# Patient Record
Sex: MALE | Race: WHITE | NOT HISPANIC OR LATINO | Employment: FULL TIME | URBAN - METROPOLITAN AREA
[De-identification: names, ages, dates, MRNs, and addresses within clinical notes are randomized per-mention and may not be internally consistent; named-entity substitution may affect disease eponyms.]

---

## 2019-08-07 ENCOUNTER — HOSPITAL ENCOUNTER (INPATIENT)
Facility: MEDICAL CENTER | Age: 44
LOS: 3 days | DRG: 872 | End: 2019-08-11
Attending: EMERGENCY MEDICINE | Admitting: INTERNAL MEDICINE
Payer: COMMERCIAL

## 2019-08-07 DIAGNOSIS — Z94.0 RENAL TRANSPLANT, STATUS POST: ICD-10-CM

## 2019-08-07 DIAGNOSIS — A41.9 SEPSIS, DUE TO UNSPECIFIED ORGANISM: ICD-10-CM

## 2019-08-07 DIAGNOSIS — K83.09 ASCENDING CHOLANGITIS: ICD-10-CM

## 2019-08-07 PROCEDURE — 99285 EMERGENCY DEPT VISIT HI MDM: CPT

## 2019-08-08 ENCOUNTER — APPOINTMENT (OUTPATIENT)
Dept: RADIOLOGY | Facility: MEDICAL CENTER | Age: 44
DRG: 872 | End: 2019-08-08
Attending: EMERGENCY MEDICINE
Payer: COMMERCIAL

## 2019-08-08 ENCOUNTER — ANESTHESIA EVENT (OUTPATIENT)
Dept: SURGERY | Facility: MEDICAL CENTER | Age: 44
DRG: 872 | End: 2019-08-08
Payer: COMMERCIAL

## 2019-08-08 ENCOUNTER — HOSPITAL ENCOUNTER (OUTPATIENT)
Dept: RADIOLOGY | Facility: MEDICAL CENTER | Age: 44
End: 2019-08-08

## 2019-08-08 ENCOUNTER — ANESTHESIA (OUTPATIENT)
Dept: SURGERY | Facility: MEDICAL CENTER | Age: 44
DRG: 872 | End: 2019-08-08
Payer: COMMERCIAL

## 2019-08-08 ENCOUNTER — APPOINTMENT (OUTPATIENT)
Dept: RADIOLOGY | Facility: MEDICAL CENTER | Age: 44
DRG: 872 | End: 2019-08-08
Attending: INTERNAL MEDICINE
Payer: COMMERCIAL

## 2019-08-08 PROBLEM — A41.9 SEPSIS (HCC): Status: ACTIVE | Noted: 2019-08-08

## 2019-08-08 PROBLEM — K83.09 ASCENDING CHOLANGITIS: Status: ACTIVE | Noted: 2019-08-08

## 2019-08-08 PROBLEM — D84.9 IMMUNOSUPPRESSION (HCC): Status: ACTIVE | Noted: 2019-08-08

## 2019-08-08 PROBLEM — E66.9 OBESITY: Status: ACTIVE | Noted: 2019-08-08

## 2019-08-08 PROBLEM — Z94.0 KIDNEY TRANSPLANTED: Status: ACTIVE | Noted: 2019-08-08

## 2019-08-08 LAB
ALBUMIN SERPL BCP-MCNC: 3.5 G/DL (ref 3.2–4.9)
ALBUMIN/GLOB SERPL: 1.3 G/DL
ALP SERPL-CCNC: 78 U/L (ref 30–99)
ALT SERPL-CCNC: 379 U/L (ref 2–50)
ANION GAP SERPL CALC-SCNC: 12 MMOL/L (ref 0–11.9)
APPEARANCE UR: CLEAR
APTT PPP: 24 SEC (ref 24.7–36)
AST SERPL-CCNC: 282 U/L (ref 12–45)
BASOPHILS # BLD AUTO: 0 % (ref 0–1.8)
BASOPHILS # BLD: 0 K/UL (ref 0–0.12)
BILIRUB SERPL-MCNC: 3.7 MG/DL (ref 0.1–1.5)
BILIRUB UR QL STRIP.AUTO: ABNORMAL
BUN SERPL-MCNC: 17 MG/DL (ref 8–22)
CALCIUM SERPL-MCNC: 8.7 MG/DL (ref 8.5–10.5)
CHLORIDE SERPL-SCNC: 109 MMOL/L (ref 96–112)
CO2 SERPL-SCNC: 19 MMOL/L (ref 20–33)
COLOR UR: ABNORMAL
CREAT SERPL-MCNC: 1.03 MG/DL (ref 0.5–1.4)
EOSINOPHIL # BLD AUTO: 0 K/UL (ref 0–0.51)
EOSINOPHIL NFR BLD: 0 % (ref 0–6.9)
ERYTHROCYTE [DISTWIDTH] IN BLOOD BY AUTOMATED COUNT: 43.9 FL (ref 35.9–50)
GLOBULIN SER CALC-MCNC: 2.8 G/DL (ref 1.9–3.5)
GLUCOSE SERPL-MCNC: 125 MG/DL (ref 65–99)
GLUCOSE UR STRIP.AUTO-MCNC: NEGATIVE MG/DL
HCT VFR BLD AUTO: 40.8 % (ref 42–52)
HGB BLD-MCNC: 13.6 G/DL (ref 14–18)
INR PPP: 1.16 (ref 0.87–1.13)
KETONES UR STRIP.AUTO-MCNC: NEGATIVE MG/DL
LACTATE BLD-SCNC: 1.8 MMOL/L (ref 0.5–2)
LEUKOCYTE ESTERASE UR QL STRIP.AUTO: NEGATIVE
LYMPHOCYTES # BLD AUTO: 0 K/UL (ref 1–4.8)
LYMPHOCYTES NFR BLD: 0 % (ref 22–41)
MANUAL DIFF BLD: ABNORMAL
MCH RBC QN AUTO: 29.3 PG (ref 27–33)
MCHC RBC AUTO-ENTMCNC: 33.3 G/DL (ref 33.7–35.3)
MCV RBC AUTO: 87.9 FL (ref 81.4–97.8)
MICRO URNS: ABNORMAL
MONOCYTES # BLD AUTO: 1.53 K/UL (ref 0–0.85)
MONOCYTES NFR BLD AUTO: 4.3 % (ref 0–13.4)
MORPHOLOGY BLD-IMP: NORMAL
NEUTROPHILS # BLD AUTO: 33.97 K/UL (ref 1.82–7.42)
NEUTROPHILS NFR BLD: 84.6 % (ref 44–72)
NEUTS BAND NFR BLD MANUAL: 11.1 % (ref 0–10)
NITRITE UR QL STRIP.AUTO: NEGATIVE
NRBC # BLD AUTO: 0 K/UL
NRBC BLD-RTO: 0 /100 WBC
PH UR STRIP.AUTO: 6.5 [PH] (ref 5–8)
PLATELET # BLD AUTO: 256 K/UL (ref 164–446)
PLATELET BLD QL SMEAR: NORMAL
PMV BLD AUTO: 9.5 FL (ref 9–12.9)
POTASSIUM SERPL-SCNC: 4 MMOL/L (ref 3.6–5.5)
PROT SERPL-MCNC: 6.3 G/DL (ref 6–8.2)
PROT UR QL STRIP: NEGATIVE MG/DL
PROTHROMBIN TIME: 15.1 SEC (ref 12–14.6)
RBC # BLD AUTO: 4.64 M/UL (ref 4.7–6.1)
RBC BLD AUTO: NORMAL
RBC UR QL AUTO: NEGATIVE
SODIUM SERPL-SCNC: 140 MMOL/L (ref 135–145)
SP GR UR STRIP.AUTO: 1.02
UROBILINOGEN UR STRIP.AUTO-MCNC: 1 MG/DL
WBC # BLD AUTO: 35.5 K/UL (ref 4.8–10.8)

## 2019-08-08 PROCEDURE — 71045 X-RAY EXAM CHEST 1 VIEW: CPT

## 2019-08-08 PROCEDURE — 700105 HCHG RX REV CODE 258: Performed by: EMERGENCY MEDICINE

## 2019-08-08 PROCEDURE — 700101 HCHG RX REV CODE 250: Performed by: ANESTHESIOLOGY

## 2019-08-08 PROCEDURE — 0F798DZ DILATION OF COMMON BILE DUCT WITH INTRALUMINAL DEVICE, VIA NATURAL OR ARTIFICIAL OPENING ENDOSCOPIC: ICD-10-PCS | Performed by: INTERNAL MEDICINE

## 2019-08-08 PROCEDURE — 700111 HCHG RX REV CODE 636 W/ 250 OVERRIDE (IP): Performed by: ANESTHESIOLOGY

## 2019-08-08 PROCEDURE — 770020 HCHG ROOM/CARE - TELE (206)

## 2019-08-08 PROCEDURE — 160203 HCHG ENDO MINUTES - 1ST 30 MINS LEVEL 4: Performed by: INTERNAL MEDICINE

## 2019-08-08 PROCEDURE — 502240 HCHG MISC OR SUPPLY RC 0272: Performed by: INTERNAL MEDICINE

## 2019-08-08 PROCEDURE — 700102 HCHG RX REV CODE 250 W/ 637 OVERRIDE(OP): Performed by: INTERNAL MEDICINE

## 2019-08-08 PROCEDURE — 87040 BLOOD CULTURE FOR BACTERIA: CPT

## 2019-08-08 PROCEDURE — 85730 THROMBOPLASTIN TIME PARTIAL: CPT

## 2019-08-08 PROCEDURE — 700111 HCHG RX REV CODE 636 W/ 250 OVERRIDE (IP): Performed by: INTERNAL MEDICINE

## 2019-08-08 PROCEDURE — 94760 N-INVAS EAR/PLS OXIMETRY 1: CPT

## 2019-08-08 PROCEDURE — 87086 URINE CULTURE/COLONY COUNT: CPT

## 2019-08-08 PROCEDURE — 700101 HCHG RX REV CODE 250: Performed by: INTERNAL MEDICINE

## 2019-08-08 PROCEDURE — A9270 NON-COVERED ITEM OR SERVICE: HCPCS | Performed by: INTERNAL MEDICINE

## 2019-08-08 PROCEDURE — 36415 COLL VENOUS BLD VENIPUNCTURE: CPT

## 2019-08-08 PROCEDURE — 700111 HCHG RX REV CODE 636 W/ 250 OVERRIDE (IP): Performed by: EMERGENCY MEDICINE

## 2019-08-08 PROCEDURE — 500066 HCHG BITE BLOCK, ECT: Performed by: INTERNAL MEDICINE

## 2019-08-08 PROCEDURE — 160048 HCHG OR STATISTICAL LEVEL 1-5: Performed by: INTERNAL MEDICINE

## 2019-08-08 PROCEDURE — 80053 COMPREHEN METABOLIC PANEL: CPT

## 2019-08-08 PROCEDURE — 160208 HCHG ENDO MINUTES - EA ADDL 1 MIN LEVEL 4: Performed by: INTERNAL MEDICINE

## 2019-08-08 PROCEDURE — 110371 HCHG SHELL REV 272: Performed by: INTERNAL MEDICINE

## 2019-08-08 PROCEDURE — 160002 HCHG RECOVERY MINUTES (STAT): Performed by: INTERNAL MEDICINE

## 2019-08-08 PROCEDURE — 81003 URINALYSIS AUTO W/O SCOPE: CPT

## 2019-08-08 PROCEDURE — 85610 PROTHROMBIN TIME: CPT

## 2019-08-08 PROCEDURE — 99223 1ST HOSP IP/OBS HIGH 75: CPT | Performed by: INTERNAL MEDICINE

## 2019-08-08 PROCEDURE — 85027 COMPLETE CBC AUTOMATED: CPT

## 2019-08-08 PROCEDURE — 85007 BL SMEAR W/DIFF WBC COUNT: CPT

## 2019-08-08 PROCEDURE — C2617 STENT, NON-COR, TEM W/O DEL: HCPCS | Performed by: INTERNAL MEDICINE

## 2019-08-08 PROCEDURE — 99358 PROLONG SERVICE W/O CONTACT: CPT | Performed by: INTERNAL MEDICINE

## 2019-08-08 PROCEDURE — 83605 ASSAY OF LACTIC ACID: CPT

## 2019-08-08 PROCEDURE — 700105 HCHG RX REV CODE 258: Performed by: INTERNAL MEDICINE

## 2019-08-08 PROCEDURE — 160009 HCHG ANES TIME/MIN: Performed by: INTERNAL MEDICINE

## 2019-08-08 PROCEDURE — 160035 HCHG PACU - 1ST 60 MINS PHASE I: Performed by: INTERNAL MEDICINE

## 2019-08-08 PROCEDURE — 96365 THER/PROPH/DIAG IV INF INIT: CPT

## 2019-08-08 PROCEDURE — 304561 HCHG STAT O2

## 2019-08-08 DEVICE — STENT BILIARY ADVANTIX 10FR X 7CM: Type: IMPLANTABLE DEVICE | Status: FUNCTIONAL

## 2019-08-08 RX ORDER — MIDAZOLAM HYDROCHLORIDE 1 MG/ML
INJECTION INTRAMUSCULAR; INTRAVENOUS PRN
Status: DISCONTINUED | OUTPATIENT
Start: 2019-08-08 | End: 2019-08-08 | Stop reason: SURG

## 2019-08-08 RX ORDER — PROMETHAZINE HYDROCHLORIDE 25 MG/1
12.5-25 SUPPOSITORY RECTAL EVERY 4 HOURS PRN
Status: DISCONTINUED | OUTPATIENT
Start: 2019-08-08 | End: 2019-08-11 | Stop reason: HOSPADM

## 2019-08-08 RX ORDER — AMOXICILLIN 250 MG
2 CAPSULE ORAL 2 TIMES DAILY
Status: DISCONTINUED | OUTPATIENT
Start: 2019-08-08 | End: 2019-08-11 | Stop reason: HOSPADM

## 2019-08-08 RX ORDER — TACROLIMUS 1 MG/1
1 CAPSULE ORAL 2 TIMES DAILY
Status: DISCONTINUED | OUTPATIENT
Start: 2019-08-08 | End: 2019-08-11 | Stop reason: HOSPADM

## 2019-08-08 RX ORDER — MEPERIDINE HYDROCHLORIDE 25 MG/ML
12.5 INJECTION INTRAMUSCULAR; INTRAVENOUS; SUBCUTANEOUS
Status: DISCONTINUED | OUTPATIENT
Start: 2019-08-08 | End: 2019-08-08 | Stop reason: HOSPADM

## 2019-08-08 RX ORDER — POLYETHYLENE GLYCOL 3350 17 G/17G
1 POWDER, FOR SOLUTION ORAL
Status: DISCONTINUED | OUTPATIENT
Start: 2019-08-08 | End: 2019-08-11 | Stop reason: HOSPADM

## 2019-08-08 RX ORDER — ONDANSETRON 2 MG/ML
INJECTION INTRAMUSCULAR; INTRAVENOUS PRN
Status: DISCONTINUED | OUTPATIENT
Start: 2019-08-08 | End: 2019-08-08 | Stop reason: SURG

## 2019-08-08 RX ORDER — ACETAMINOPHEN 325 MG/1
650 TABLET ORAL EVERY 6 HOURS PRN
Status: DISCONTINUED | OUTPATIENT
Start: 2019-08-08 | End: 2019-08-11 | Stop reason: HOSPADM

## 2019-08-08 RX ORDER — OXYCODONE HYDROCHLORIDE 5 MG/1
5 TABLET ORAL
Status: DISCONTINUED | OUTPATIENT
Start: 2019-08-08 | End: 2019-08-11 | Stop reason: HOSPADM

## 2019-08-08 RX ORDER — ROCURONIUM BROMIDE 10 MG/ML
INJECTION, SOLUTION INTRAVENOUS PRN
Status: DISCONTINUED | OUTPATIENT
Start: 2019-08-08 | End: 2019-08-08 | Stop reason: SURG

## 2019-08-08 RX ORDER — SODIUM CHLORIDE, SODIUM LACTATE, POTASSIUM CHLORIDE, CALCIUM CHLORIDE 600; 310; 30; 20 MG/100ML; MG/100ML; MG/100ML; MG/100ML
INJECTION, SOLUTION INTRAVENOUS CONTINUOUS
Status: DISCONTINUED | OUTPATIENT
Start: 2019-08-08 | End: 2019-08-08 | Stop reason: HOSPADM

## 2019-08-08 RX ORDER — SODIUM CHLORIDE, SODIUM LACTATE, POTASSIUM CHLORIDE, CALCIUM CHLORIDE 600; 310; 30; 20 MG/100ML; MG/100ML; MG/100ML; MG/100ML
INJECTION, SOLUTION INTRAVENOUS CONTINUOUS
Status: DISCONTINUED | OUTPATIENT
Start: 2019-08-08 | End: 2019-08-09

## 2019-08-08 RX ORDER — LIDOCAINE HYDROCHLORIDE 20 MG/ML
INJECTION, SOLUTION EPIDURAL; INFILTRATION; INTRACAUDAL; PERINEURAL PRN
Status: DISCONTINUED | OUTPATIENT
Start: 2019-08-08 | End: 2019-08-08 | Stop reason: SURG

## 2019-08-08 RX ORDER — DIPHENHYDRAMINE HYDROCHLORIDE 50 MG/ML
12.5 INJECTION INTRAMUSCULAR; INTRAVENOUS
Status: DISCONTINUED | OUTPATIENT
Start: 2019-08-08 | End: 2019-08-08 | Stop reason: HOSPADM

## 2019-08-08 RX ORDER — ONDANSETRON 2 MG/ML
4 INJECTION INTRAMUSCULAR; INTRAVENOUS
Status: DISCONTINUED | OUTPATIENT
Start: 2019-08-08 | End: 2019-08-11 | Stop reason: HOSPADM

## 2019-08-08 RX ORDER — OXYCODONE HCL 5 MG/5 ML
10 SOLUTION, ORAL ORAL
Status: DISCONTINUED | OUTPATIENT
Start: 2019-08-08 | End: 2019-08-08 | Stop reason: HOSPADM

## 2019-08-08 RX ORDER — BISACODYL 10 MG
10 SUPPOSITORY, RECTAL RECTAL
Status: DISCONTINUED | OUTPATIENT
Start: 2019-08-08 | End: 2019-08-11 | Stop reason: HOSPADM

## 2019-08-08 RX ORDER — PREDNISONE 5 MG/1
7.5 TABLET ORAL DAILY
COMMUNITY

## 2019-08-08 RX ORDER — OXYCODONE HCL 5 MG/5 ML
5 SOLUTION, ORAL ORAL
Status: DISCONTINUED | OUTPATIENT
Start: 2019-08-08 | End: 2019-08-08 | Stop reason: HOSPADM

## 2019-08-08 RX ORDER — ONDANSETRON 2 MG/ML
4 INJECTION INTRAMUSCULAR; INTRAVENOUS EVERY 4 HOURS PRN
Status: DISCONTINUED | OUTPATIENT
Start: 2019-08-08 | End: 2019-08-11 | Stop reason: HOSPADM

## 2019-08-08 RX ORDER — PREDNISONE 5 MG/1
7.5 TABLET ORAL DAILY
Status: DISCONTINUED | OUTPATIENT
Start: 2019-08-08 | End: 2019-08-11 | Stop reason: HOSPADM

## 2019-08-08 RX ORDER — HYDROMORPHONE HYDROCHLORIDE 1 MG/ML
0.4 INJECTION, SOLUTION INTRAMUSCULAR; INTRAVENOUS; SUBCUTANEOUS
Status: DISCONTINUED | OUTPATIENT
Start: 2019-08-08 | End: 2019-08-08 | Stop reason: HOSPADM

## 2019-08-08 RX ORDER — HYDROMORPHONE HYDROCHLORIDE 1 MG/ML
0.2 INJECTION, SOLUTION INTRAMUSCULAR; INTRAVENOUS; SUBCUTANEOUS
Status: DISCONTINUED | OUTPATIENT
Start: 2019-08-08 | End: 2019-08-08 | Stop reason: HOSPADM

## 2019-08-08 RX ORDER — PROMETHAZINE HYDROCHLORIDE 25 MG/1
12.5-25 TABLET ORAL EVERY 4 HOURS PRN
Status: DISCONTINUED | OUTPATIENT
Start: 2019-08-08 | End: 2019-08-11 | Stop reason: HOSPADM

## 2019-08-08 RX ORDER — ONDANSETRON 4 MG/1
4 TABLET, ORALLY DISINTEGRATING ORAL EVERY 4 HOURS PRN
Status: DISCONTINUED | OUTPATIENT
Start: 2019-08-08 | End: 2019-08-11 | Stop reason: HOSPADM

## 2019-08-08 RX ORDER — TACROLIMUS 1 MG/1
1 CAPSULE ORAL 2 TIMES DAILY
COMMUNITY

## 2019-08-08 RX ORDER — OXYCODONE HYDROCHLORIDE 5 MG/1
2.5 TABLET ORAL
Status: DISCONTINUED | OUTPATIENT
Start: 2019-08-08 | End: 2019-08-11 | Stop reason: HOSPADM

## 2019-08-08 RX ORDER — MORPHINE SULFATE 4 MG/ML
2 INJECTION, SOLUTION INTRAMUSCULAR; INTRAVENOUS
Status: DISCONTINUED | OUTPATIENT
Start: 2019-08-08 | End: 2019-08-11 | Stop reason: HOSPADM

## 2019-08-08 RX ORDER — LABETALOL HYDROCHLORIDE 5 MG/ML
5 INJECTION, SOLUTION INTRAVENOUS
Status: DISCONTINUED | OUTPATIENT
Start: 2019-08-08 | End: 2019-08-08 | Stop reason: HOSPADM

## 2019-08-08 RX ORDER — HYDROMORPHONE HYDROCHLORIDE 1 MG/ML
0.1 INJECTION, SOLUTION INTRAMUSCULAR; INTRAVENOUS; SUBCUTANEOUS
Status: DISCONTINUED | OUTPATIENT
Start: 2019-08-08 | End: 2019-08-08 | Stop reason: HOSPADM

## 2019-08-08 RX ORDER — SODIUM CHLORIDE 9 MG/ML
1000 INJECTION, SOLUTION INTRAVENOUS
Status: DISCONTINUED | OUTPATIENT
Start: 2019-08-08 | End: 2019-08-11 | Stop reason: HOSPADM

## 2019-08-08 RX ORDER — ONDANSETRON 2 MG/ML
4 INJECTION INTRAMUSCULAR; INTRAVENOUS
Status: DISCONTINUED | OUTPATIENT
Start: 2019-08-08 | End: 2019-08-08 | Stop reason: HOSPADM

## 2019-08-08 RX ADMIN — CEFEPIME 2 G: 2 INJECTION, POWDER, FOR SOLUTION INTRAVENOUS at 01:52

## 2019-08-08 RX ADMIN — SUGAMMADEX 200 MG: 100 INJECTION, SOLUTION INTRAVENOUS at 14:57

## 2019-08-08 RX ADMIN — SODIUM CHLORIDE, POTASSIUM CHLORIDE, SODIUM LACTATE AND CALCIUM CHLORIDE: 600; 310; 30; 20 INJECTION, SOLUTION INTRAVENOUS at 08:12

## 2019-08-08 RX ADMIN — MIDAZOLAM 2 MG: 1 INJECTION INTRAMUSCULAR; INTRAVENOUS at 14:09

## 2019-08-08 RX ADMIN — ROCURONIUM BROMIDE 30 MG: 10 INJECTION, SOLUTION INTRAVENOUS at 14:13

## 2019-08-08 RX ADMIN — ONDANSETRON 4 MG: 2 INJECTION INTRAMUSCULAR; INTRAVENOUS at 14:50

## 2019-08-08 RX ADMIN — ONDANSETRON 4 MG: 2 INJECTION INTRAMUSCULAR; INTRAVENOUS at 08:12

## 2019-08-08 RX ADMIN — FENTANYL CITRATE 50 MCG: 50 INJECTION, SOLUTION INTRAMUSCULAR; INTRAVENOUS at 14:52

## 2019-08-08 RX ADMIN — FENTANYL CITRATE 50 MCG: 50 INJECTION, SOLUTION INTRAMUSCULAR; INTRAVENOUS at 14:35

## 2019-08-08 RX ADMIN — PREDNISONE 7.5 MG: 5 TABLET ORAL at 06:43

## 2019-08-08 RX ADMIN — METRONIDAZOLE 500 MG: 500 INJECTION, SOLUTION INTRAVENOUS at 21:56

## 2019-08-08 RX ADMIN — PROPOFOL 130 MG: 10 INJECTION, EMULSION INTRAVENOUS at 14:13

## 2019-08-08 RX ADMIN — TACROLIMUS 1 MG: 1 CAPSULE ORAL at 18:17

## 2019-08-08 RX ADMIN — METRONIDAZOLE 500 MG: 500 INJECTION, SOLUTION INTRAVENOUS at 15:22

## 2019-08-08 RX ADMIN — ACETAMINOPHEN 650 MG: 325 TABLET, FILM COATED ORAL at 23:58

## 2019-08-08 RX ADMIN — CEFEPIME 2 G: 2 INJECTION, POWDER, FOR SOLUTION INTRAVENOUS at 13:12

## 2019-08-08 RX ADMIN — MORPHINE SULFATE 2 MG: 4 INJECTION INTRAVENOUS at 08:22

## 2019-08-08 RX ADMIN — METRONIDAZOLE 500 MG: 500 INJECTION, SOLUTION INTRAVENOUS at 08:10

## 2019-08-08 RX ADMIN — SODIUM CHLORIDE, POTASSIUM CHLORIDE, SODIUM LACTATE AND CALCIUM CHLORIDE: 600; 310; 30; 20 INJECTION, SOLUTION INTRAVENOUS at 04:07

## 2019-08-08 RX ADMIN — SODIUM CHLORIDE, POTASSIUM CHLORIDE, SODIUM LACTATE AND CALCIUM CHLORIDE: 600; 310; 30; 20 INJECTION, SOLUTION INTRAVENOUS at 18:17

## 2019-08-08 RX ADMIN — FENTANYL CITRATE 50 MCG: 50 INJECTION, SOLUTION INTRAMUSCULAR; INTRAVENOUS at 14:53

## 2019-08-08 RX ADMIN — LIDOCAINE HYDROCHLORIDE 30 ML: 20 INJECTION, SOLUTION EPIDURAL; INFILTRATION; INTRACAUDAL; PERINEURAL at 14:13

## 2019-08-08 RX ADMIN — FENTANYL CITRATE 100 MCG: 50 INJECTION, SOLUTION INTRAMUSCULAR; INTRAVENOUS at 14:13

## 2019-08-08 RX ADMIN — TACROLIMUS 1 MG: 1 CAPSULE ORAL at 06:43

## 2019-08-08 SDOH — HEALTH STABILITY: MENTAL HEALTH: HOW OFTEN DO YOU HAVE A DRINK CONTAINING ALCOHOL?: 2-3 TIMES A WEEK

## 2019-08-08 SDOH — HEALTH STABILITY: MENTAL HEALTH: HOW MANY STANDARD DRINKS CONTAINING ALCOHOL DO YOU HAVE ON A TYPICAL DAY?: 1 OR 2

## 2019-08-08 ASSESSMENT — LIFESTYLE VARIABLES
DOES PATIENT WANT TO STOP DRINKING: YES
ALCOHOL_USE: YES
DOES PATIENT WANT TO TALK TO SOMEONE ABOUT QUITTING: NO
HAVE PEOPLE ANNOYED YOU BY CRITICIZING YOUR DRINKING: NO
EVER_SMOKED: NEVER
CONSUMPTION TOTAL: NEGATIVE
TOTAL SCORE: 0
EVER HAD A DRINK FIRST THING IN THE MORNING TO STEADY YOUR NERVES TO GET RID OF A HANGOVER: NO
EVER FELT BAD OR GUILTY ABOUT YOUR DRINKING: NO
HOW MANY TIMES IN THE PAST YEAR HAVE YOU HAD 5 OR MORE DRINKS IN A DAY: 0
ON A TYPICAL DAY WHEN YOU DRINK ALCOHOL HOW MANY DRINKS DO YOU HAVE: 2
EVER_SMOKED: NEVER
TOTAL SCORE: 0
TOTAL SCORE: 0
AVERAGE NUMBER OF DAYS PER WEEK YOU HAVE A DRINK CONTAINING ALCOHOL: .5
HAVE YOU EVER FELT YOU SHOULD CUT DOWN ON YOUR DRINKING: NO

## 2019-08-08 ASSESSMENT — COPD QUESTIONNAIRES
HAVE YOU SMOKED AT LEAST 100 CIGARETTES IN YOUR ENTIRE LIFE: NO/DON'T KNOW
COPD SCREENING SCORE: 0
DO YOU EVER COUGH UP ANY MUCUS OR PHLEGM?: NO/ONLY WITH OCCASIONAL COLDS OR INFECTIONS
DURING THE PAST 4 WEEKS HOW MUCH DID YOU FEEL SHORT OF BREATH: NONE/LITTLE OF THE TIME

## 2019-08-08 ASSESSMENT — ENCOUNTER SYMPTOMS
SORE THROAT: 0
HEADACHES: 0
SPUTUM PRODUCTION: 0
MYALGIAS: 0
SHORTNESS OF BREATH: 0
PALPITATIONS: 0
FEVER: 1
BLURRED VISION: 0
NECK PAIN: 0
COUGH: 0
VOMITING: 1
ABDOMINAL PAIN: 1
NAUSEA: 1
SEIZURES: 0
DIAPHORESIS: 0
CHILLS: 1
BACK PAIN: 0
DIARRHEA: 0
FLANK PAIN: 0
BLOOD IN STOOL: 0
DIZZINESS: 0
BRUISES/BLEEDS EASILY: 0
WHEEZING: 0
FOCAL WEAKNESS: 0

## 2019-08-08 ASSESSMENT — COGNITIVE AND FUNCTIONAL STATUS - GENERAL
SUGGESTED CMS G CODE MODIFIER DAILY ACTIVITY: CH
MOBILITY SCORE: 24
DAILY ACTIVITIY SCORE: 24
SUGGESTED CMS G CODE MODIFIER MOBILITY: CH

## 2019-08-08 ASSESSMENT — PATIENT HEALTH QUESTIONNAIRE - PHQ9
1. LITTLE INTEREST OR PLEASURE IN DOING THINGS: NOT AT ALL
SUM OF ALL RESPONSES TO PHQ9 QUESTIONS 1 AND 2: 0
2. FEELING DOWN, DEPRESSED, IRRITABLE, OR HOPELESS: NOT AT ALL

## 2019-08-08 ASSESSMENT — PAIN SCALES - GENERAL: PAIN_LEVEL: 0

## 2019-08-08 NOTE — ANESTHESIA POSTPROCEDURE EVALUATION
Patient: Gerardo Treviño    Procedure Summary     Date:  08/08/19 Room / Location:  [facility] 06 / SURGERY [facility]    Anesthesia Start:  1409 Anesthesia Stop:  1508    Procedure:  ERCP (ENDOSCOPIC RETROGRADE CHOLANGIOPANCREATOGRAPHY) (N/A Mouth) Diagnosis:  (stent placement)    Surgeon:  Govind Paula M.D. Responsible Provider:  Gaurav Dye M.D.    Anesthesia Type:  general ASA Status:  3          Final Anesthesia Type: general  Last vitals  BP   Blood Pressure: (!) 95/50    Temp   37.3 °C (99.1 °F)    Pulse   Pulse: 82   Resp   18    SpO2   95 %      Anesthesia Post Evaluation    Patient location during evaluation: PACU  Patient participation: complete - patient participated  Level of consciousness: awake and alert  Pain score: 0    Airway patency: patent  Anesthetic complications: no  Cardiovascular status: hemodynamically stable  Respiratory status: acceptable  Hydration status: euvolemic    PONV: none           Nurse Pain Score: 0 (NPRS)

## 2019-08-08 NOTE — ASSESSMENT & PLAN NOTE
ERCP 8/8 with stone removal, pus drainage and stent placement  On IV abx with ceftriaxone and metronidazole  Pain control  Follow culture: NTD  GI following  Follow cmp in am

## 2019-08-08 NOTE — ANESTHESIA QCDR
2019 [facility] Clinical Data Registry (for Quality Improvement)     Postoperative nausea/vomiting risk protocol (Adult = 18 yrs and Pediatric 3-17 yrs)- (430 and 463)  General inhalation anesthetic (NOT TIVA) with PONV risk factors: Yes  Provision of anti-emetic therapy with at least 2 different classes of agents: No   Patient DID NOT receive anti-emetic therapy and reason is documented in Medical Record: Yes       Multimodal Pain Management- (AQI59)  Patient undergoing Elective Surgery (i.e. Outpatient, or ASC, or Prescheduled Surgery prior to Hospital Admission): No  Use of Multimodal Pain Management, two or more drugs and/or interventions, NOT including systemic opioids: N/A  Exception: Documented allergy to multiple classes of analgesics: N/A    PACU assessment of acute postoperative pain prior to Anesthesia Care End- Applies to Patients Age = 18- (ABG7)  Initial PACU pain score is which of the following: < 7/10  Patient unable to report pain score: N/A    Post-anesthetic transfer of care checklist/protocol to PACU/ICU- (426 and 427)  Upon conclusion of case, patient transferred to which of the following locations: PACU/Non-ICU  Use of transfer checklist/protocol: Yes  Exclusion: Service Performed in Patient Hospital Room (and thus did not require transfer): N/A    PACU Reintubation- (AQI31)  General anesthesia requiring endotracheal intubation (ETT) along with subsequent extubation in OR or PACU: Yes  Required reintubation in the PACU: No   Extubation was a planned trial documented in the medical record prior to removal of the original airway device:  N/A    Unplanned admission to ICU related to anesthesia service up through end of PACU care- (MD51)  Unplanned admission to ICU (not initially anticipated at anesthesia start time): No          flank pain

## 2019-08-08 NOTE — PROCEDURES
Endoscopic Retrograde Cholangiopancreatography    Date of Procedure:  8/8/2019  Primary Care Provider:    Attending Physician:  Govind Paula MD  Indications: Ascending cholangitis        Instrument: Olympus Flexible Sideviewing Endoscope  Sedation:  Anesthesiologist/Type of Anesthesia:  Anesthesiologist: Gaurav Dye M.D./General    Surgical Staff:  Circulator: Brett Hinton R.N.  Endoscopy Technician: Stella Teran    Pre-Anesthesia Assessment:  Prior to the procedure, a History and Physical was performed, and patient medications and allergies were reviewed. The patient’s tolerance of previous anesthesia was also reviewed. The risks and benefits of the procedure and the sedation options and risks were discussed with the patient including but not limited to infection, bleeding, aspiration, perforation, adverse medication reaction, missed diagnosis, missed lesions, and pancreatitis. The patient verbalized understanding. All questions were answered, and informed consent was obtained        After I obtained informed consent from the patient, the patient was placed in the prone/swimmer position. Appropriate time-out protocol was followed: the correct patient, the correct procedure, and the correct equipment in the room were confirmed. Throughout the procedure, the patient’s blood pressure, pulse, and oxygen saturations were monitored continuously. The Olymus flexible sideviewing duodenoscope was inserted through the oropharynx, esophagus intubated, then advanced to the gastrointestinal tract to the major papilla. The duct(s) were cannulated and contrast was injected I personally interpreted the ductal images.  Findings and interventions were performed and documented below. Air was then withdrawn and the duodenoscope was removed. The patient tolerated the procedure well. There were no immediate postoperative complications    Findings:     film was normal.  Scope was inserted to the second portion of duodenum.   Post sphincterotomy anatomy with no prominent ampulla.  Purulent material was noted draining from the biliary orifice.  Cannulation with a 0.025 wire sphincterotome was pure into the biliary system.  Wire was advanced into the left intrahepatic duct.  Test cholangiogram demonstrated biliary anatomy as well as filling defect.  Suction was performed with significant amount of white purulent material suggestive of pus.  Utilizing a 9-12 mm extractor balloon then changed to the 12 to 15 mm balloon multiple sweeps were performed from left intrahepatic as well as right intrahepatic duct (wire was able to be manipulated into the right duct) with removal of significant amount of stone sludge and debris.  Multiple sweeps were performed.  Finally the intrahepatic duct were then flushed with sterile saline.  Placement of a prophylactic 10 Urdu by 7 cm plastic biliary stent to keep the biliary orifice open and allow further drainage and prevention of occlusion.  Please note a 12 mm balloon was able to be extracted via the biliary orifice without difficulty    Pancreatic duct was never cannulated or injected    Bilateral films under diaphragm was negative for free air    Impressions:   1.  Ascending cholangitis with pus as well as multiple stones and sludge removed from the dilated common bile duct.  2. Placement of 10Fr x 7cm plastic common bile duct stent    Recommendations:   1. Continue broad spectrum antibiotics  2. Monitor LFT, WBC  3. NPO for 4 hours and if no pain, advance diet as tolerated to clears and advance as tolerated  4. CBD stent need to be removed by ERCP by 3 months; can be done at patient's local hospital system at [place].      NOTE: Radiologic interpretation of dynamic and static fluoroscopic imaging by myself.  At no time was/were a Radiologist present.     This note was generated using voice recognition software which has a small chance of producing errors of grammar and possibly content. I have made  every reasonable attempt to find and correct any obvious errors, but expect that some may not be found prior to finalization of this note

## 2019-08-08 NOTE — H&P
Hospital Medicine History & Physical Note    Date of Service  8/8/2019    Primary Care Physician  No primary care provider on file.    Consultants  GI Dr. Nassar    Code Status  Full code    Chief Complaint  Abdominal pain    History of Presenting Illness  44 y.o. 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  Patient was transferred from [facility] for concerns of ascending cholangitis and higher level of care.  Patient suddenly developed right upper quadrant abdominal pain without radiation associated with nausea and vomiting.  He also reported subjective fevers and chills.  He denied any diarrhea or blood in his stools.  He denies any chest pains, shortness of breath, dysuria or headache.  He has been compliant with his medications.      I reviewed the medical records from the transferring facility which are summarized as follows:  Initial vitals: Temperature 98.1, blood pressure 110/86, respiratory rate 21, SPO2 91%, pulse rate 111  EKG: Sinus tachycardia with nonspecific ST changes and slight ST depression  Chest x-ray: No acute cardiopulmonary process  CT abdomen pelvis without contrast: Obstruction of the distal common bile duct by either stone or mass with marked common bile duct dilatation and intrahepatic biliary dilatation.  Here also noted in the distal biliary radicles of the liver.  Prior cholecystectomy with cystic duct remnant also dilated.  Atrophic pancreas with mild pancreatic duct dilation.  Occasional diverticuli seen throughout the colon. Bilateral small shrunken kidneys.  Transplanted kidney in the left side of the pelvis with nephrolithiasis.  Stone in the upper pole measures 9 mm.  No evidence of hydronephrosis.  Sodium 141, potassium 3.5, chloride 102, CO2  22, anion gap 17, glucose 232, BUN 14, creatinine 1.14, calcium 10, total bili 1.8, alkaline phosphatase 100, , , total protein 7.8, albumin 3.9, lipase 117  Lactic acid 5  WBC 28, hemoglobin 16, platelets 325, neutrophils 92%  UA negative for infection    The patient was treated with IV Toradol, IV fentanyl, normal saline boluses, IV Flagyl and IV Levaquin.      Review of Systems  Review of Systems   Constitutional: Positive for chills and fever. Negative for diaphoresis.   HENT: Negative for hearing loss and sore throat.    Eyes: Negative for blurred vision.   Respiratory: Negative for cough, sputum production, shortness of breath and wheezing.    Cardiovascular: Negative for chest pain, palpitations and leg swelling.   Gastrointestinal: Positive for abdominal pain, nausea and vomiting. Negative for blood in stool and diarrhea.   Genitourinary: Negative for dysuria, flank pain and urgency.   Musculoskeletal: Negative for back pain, joint pain, myalgias and neck pain.   Skin: Negative for rash.   Neurological: Negative for dizziness, focal weakness, seizures and headaches.   Endo/Heme/Allergies: Does not bruise/bleed easily.   Psychiatric/Behavioral: Negative for suicidal ideas.   All other systems reviewed and are negative.      Past Medical History   has a past medical history of Renal disorder (1992).    Surgical History  Kidney transplant, cholecystectomy    Family History  No pertinent family history    Social History   reports that he has never smoked. He has never used smokeless tobacco. He reports that he drinks alcohol. He reports that he has current or past drug history. Drug: Inhaled.    Allergies  Allergies   Allergen Reactions   • Penicillins      Hives   • Vancomycin      Increased BP       Medications  Prednisone 7.5 mg daily, tacrolimus 1 mg twice daily       Physical Exam  Temp:  [37.2 °C (98.9 °F)] 37.2 °C (98.9 °F)  Pulse:  [86-97] 86  Resp:  [18-31] 31  BP: (101-113)/(71-81)  101/71  SpO2:  [93 %-95 %] 93 %    Physical Exam   Constitutional: He is oriented to person, place, and time. He appears well-developed and well-nourished. No distress.   HENT:   Head: Normocephalic and atraumatic.   Mouth/Throat: Oropharynx is clear and moist.   Eyes: Pupils are equal, round, and reactive to light. Conjunctivae are normal. No scleral icterus.   Neck: Normal range of motion. Neck supple.   Cardiovascular: Regular rhythm and normal heart sounds.   Tachycardic   Pulmonary/Chest: Effort normal and breath sounds normal. No respiratory distress. He has no wheezes. He has no rales.   Abdominal: Soft. Bowel sounds are normal. He exhibits no distension. There is tenderness (Right upper quadrant). There is no rebound.   Musculoskeletal: Normal range of motion. He exhibits no edema or tenderness.   Lymphadenopathy:     He has no cervical adenopathy.   Neurological: He is alert and oriented to person, place, and time. No cranial nerve deficit. Coordination normal.   Skin: Skin is warm. No rash noted.   Psychiatric: He has a normal mood and affect. His behavior is normal.   Nursing note and vitals reviewed.      Laboratory:  Recent Labs     08/08/19  0055   WBC 35.5*   RBC 4.64*   HEMOGLOBIN 13.6*   HEMATOCRIT 40.8*   MCV 87.9   MCH 29.3   MCHC 33.3*   RDW 43.9   PLATELETCT 256   MPV 9.5     Recent Labs     08/08/19  0055   SODIUM 140   POTASSIUM 4.0   CHLORIDE 109   CO2 19*   GLUCOSE 125*   BUN 17   CREATININE 1.03   CALCIUM 8.7     Recent Labs     08/08/19  0055   ALTSGPT 379*   ASTSGOT 282*   ALKPHOSPHAT 78   TBILIRUBIN 3.7*   GLUCOSE 125*         No results for input(s): NTPROBNP in the last 72 hours.      No results for input(s): TROPONINT in the last 72 hours.    Urinalysis:    No results found     Imaging:  OUTSIDE IMAGES-DX CHEST   Final Result      OUTSIDE IMAGES-CT ABDOMEN /PELVIS   Final Result      DX-CHEST-PORTABLE (1 VIEW)   Final Result      No acute cardiopulmonary abnormality.             Assessment/Plan:  I anticipate this patient will require at least two midnights for appropriate medical management, necessitating inpatient admission.    Sepsis (HCC)- (present on admission)  Assessment & Plan  This is sepsis (without associated acute organ dysfunction).   Likely source is cholangitis  Patient has been started on IV fluids per septic protocol  Lactate was elevated at 5 and has trended down to 1.8  Patient is started on IV cefepime and IV Flagyl  Follow blood cultures      Ascending cholangitis- (present on admission)  Assessment & Plan  N.p.o.  Patient has been started on IV cefepime and IV Flagyl  GI Dr. Nassar has been consulted, patient will likely undergo ERCP in the morning  IV fluid hydration with LR  Pain control with oxycodone and IV morphine, monitor respiratory status closely    Immunosuppression (HCC)- (present on admission)  Assessment & Plan  Due to prednisone and tacrolimus    Kidney transplanted- (present on admission)  Assessment & Plan  Continue prednisone and tacrolimus      VTE prophylaxis: scd    I spent a total of 30 minutes of non face to face time performing additional research, reviewing medical records from transferring facility, discussing plan of care with other healthcare providers. Start time: 2 25 am. End time: 2 55 am

## 2019-08-08 NOTE — PROGRESS NOTES
Indication:Cholangitis.   Risks, benefits, and alternatives were discussed with with consenting person(s). Consenting person(s) were given an opportunity to ask questions and discuss other options. Risks including but not limited to failed or incomplete ERCP, stent migration, ineffective therapy, radiation exposure, pancreatitis (with potential future complications), contrast reaction, perforation, infection, bleeding, missed lesion(s), cardiac and/or pulmonary event, aspiration, stroke, possible need for surgery, hospitalization possibly prolonged, discomfort, unsuccessful and/or incomplete procedure, possible need for repeat procedures and/or additional testings, damage to adjacent organs and/or vascular structures, medication reaction, disability, death, and other adverse events possibly life-threatening. Discussion was undertaken with Layman's terms. Consenting persons stated understanding and acceptance of these risks, and wished to proceed. Consent was given in clear state of mind.

## 2019-08-08 NOTE — PROGRESS NOTES
Admission 2 RN skin check complete.   Devices in place hearing aid R ear.  Skin assessed under devices ears intact, pink, blanchable.  Confirmed pressure ulcers found on none.  New potential pressure ulcers noted on n/a. Wound consult placed n/a.  The following interventions in place ears assessed, pt turns self in bed from side to side, ambulates freely, pillows for support    Skin pink, CDI. Ears, elbows, buttocks, knees and heels assessed for pressure injuries, none found.

## 2019-08-08 NOTE — PROGRESS NOTES
GI Consults    Full note dictated  Impression:  1.  Ascending cholangitis  2.  Abnormal imaging biliary tract with obstruction  3.  Abnormal liver enzymes  4.  Leukocytosis with bandemia  5.  RUQ pain, improved  6.  Hx of choledocholithiasis 2011, s/p ERCP x 3 requiring mechanical lithotripsy  7.  S/p cholecystectomy 1996  8.  Alport syndrome with hearing loss, decreased eyesight and renal failure  9.  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  NPO  2.  No NSAIDS or anticoagulation.  Did receive toradol x 1 yesterday  3.  PCN and Vancomycin allergy  4.  Continue cefepime and metronidazole  5.  ERCP today with Dr. Paula at 2pm

## 2019-08-08 NOTE — ANESTHESIA PREPROCEDURE EVALUATION
Relevant Problems   Other   (+) Ascending cholangitis   (+) Immunosuppression (HCC)   (+) Kidney transplanted   (+) Sepsis (HCC)       Physical Exam    Airway   Mallampati: II  TM distance: >3 FB  Neck ROM: full       Cardiovascular - normal exam  Rhythm: regular  Rate: normal  (-) murmur     Dental - normal exam           Pulmonary - normal exam  Breath sounds clear to auscultation     Abdominal    Neurological - normal exam                 Anesthesia Plan    ASA 3 (multiple renal transplant history/sepsis)   ASA physical status 3 criteria: other (comment)    Plan - general       Airway plan will be ETT        Induction: intravenous    Postoperative Plan: Postoperative administration of opioids is intended.    Pertinent diagnostic labs and testing reviewed    Informed Consent:    Anesthetic plan and risks discussed with patient.    Use of blood products discussed with: patient whom consented to blood products.

## 2019-08-08 NOTE — CONSULTS
DATE OF SERVICE:  08/08/2019    GASTROENTEROLOGY CONSULTATION    REFERRING PHYSICIAN:  Jt Diaz MD    REASON FOR THE CONSULT:  Ascending cholangitis.    HISTORY OF PRESENT ILLNESS:  The patient is a 44-year-old male who underwent   an initial cadaveric renal transplant in 1993 that was rejected within 24   hours, a second cadaveric transplant that was rejected in 48 hours, and then a   living-related kidney transplant.  Prior to that, he was on continuous   ambulatory peritoneal dialysis and had several episodes of peritonitis.  He   was on cyclosporine until 2015 and was diagnosed with a squamous cell skin   cancer.  He was then changed to Prograf.  He has been on prednisone since   transplant.  He has not had a bone density exam.    He lives in [place], [place] and was camping at [place].  He had been   having some minor right upper quadrant pain, but on Tuesday, 08/06, the pain   increased in intensity.  He had associated fevers, chills, nausea and   vomiting.  Yesterday, the pain was unbearable and was radiating into his back.    He was seen locally in the [place] area and then transferred here to [facility]   for a higher level of care.  Currently, he has not had vomiting, not having   fever or chills, did have mild nausea this morning, and his pain is much   improved.    In 12/2010, he presented to [facility] in [place] with right upper quadrant pain   and findings of ascending cholangitis.  He had a cholecystectomy in 1996.  He   had an ERCP in 12/2010, but due to the cholangitis, intervention was not   performed.  Then, in 01/2011, he underwent ERCP with Dr. Barney and was found to   have a large common bile duct stone, 1.32 to 2.5 cm in size.  He had 2   subcentimeter stones.  He had a sphincterotomy and a sphincteroplasty to 10   mm.  They attempted balloon extraction, but that was unsuccessful.  He had a   partial lithotripsy.  They were unable to extract all the stone remnants and 2   plastic stents were  placed.  A third ERCP was performed in 2011 where he   did have mechanical lithotripsy, balloon extraction of fragments, and there   was no defect noted on the occlusion cholangiogram.  The patient has not had   any problems with right upper quadrant pain, cholangitis until present.    Yesterday, his total bilirubin was 1.8, alkaline phosphatase 100, , ALT   286, and his WBC was 28.    ALLERGIES:  VANCOMYCIN AND PENICILLIN.    MEDICATIONS PRIOR TO ADMISSION:  Prednisone 7.5 mg daily, tacrolimus 1 mg   twice daily.    SURGERIES:  1.  Renal transplant x3, please see above.  2.  Peritoneal dialysis catheter placement.  3.  ERCP x3 in  and .    MEDICAL ILLNESSES:  Alport syndrome, renal transplant, ascending cholangitis,   choledocholithiasis, squamous cell skin cancer.    SOCIAL HISTORY:  He is .  He drinks alcohol on occasion, does not smoke   tobacco, smokes marijuana on occasion.  No illicit drug use.    FAMILY HISTORY:  Mother carrier for Alport syndrome, sister carrier for Alport   syndrome and  due to drug overdose.  Next sister kidney donor.  No   family history of gallbladder disease.    REVIEW OF SYSTEMS:  GENERAL:  Recent fevers, chills, nausea, vomiting.  HEENT:  Mild headache.  No epistaxis.  PULMONARY:  Shortness of breath when right upper quadrant pain was intense.  CARDIOVASCULAR:  No peripheral edema, no chest pain.  GASTROINTESTINAL:  As above.  GENITOURINARY:  No dysuria or hematuria.  MUSCULOSKELETAL:  No complaints of myalgias or arthralgias.  SKIN:  No pruritus.  No rashes.  NEUROLOGIC:  No lightheadedness, weakness, excessive fatigue.  PSYCHIATRIC:  No depression.    PHYSICAL EXAMINATION:  VITAL SIGNS:  Temperature 36.8, pulse 80, blood pressure 139/89, respiratory   rate 17.  GENERAL:  This is a 44-year-old male, who was in no acute distress.  HEENT:  Sclerae appear icteric.  Pupils are round and reactive.  No oral   lesions.  NECK:  Soft.  No adenopathy.  LUNGS:   Clear to auscultation.  CARDIOVASCULAR:  S1, S2, without murmur, gallop, or rub.  ABDOMEN:  Bowel sounds present, soft, nontender.  No palpable masses and   transplant not palpable.  RECTAL:  Deferred.  EXTREMITIES:  No clubbing, cyanosis, or peripheral edema.  SKIN:  Smooth, moist, and warm.  NEUROLOGIC:  He is awake, alert, oriented, moving all extremities.    LABORATORY DATA:  WBC 35.5, hemoglobin 13.6, hematocrit 40.8, platelets 256,   11% bands.  INR 1.16.  , , alkaline phosphatase 78, total   bilirubin 3.7, BUN 17, creatinine 1.03.    IMAGING:  Outside imaging with suggestion of obstruction in the distal common   bile duct, marked common bile duct dilation with intrahepatic dilation and   that was a CT abdomen and pelvis without contrast.    IMPRESSION:  1.  Ascending cholangitis.  2.  Abnormal imaging biliary tract obstruction.  3.  Abnormal liver enzymes.  4.  Leukocytosis with bandemia.  5.  Right upper quadrant pain, improved.  6.  History of choledocholithiasis in 2010 and 2011, status post endoscopic   retrograde cholangiopancreatography x3 requiring mechanical lithotripsy.  7.  Status post cholecystectomy in 1996.  8.  Alport syndrome with hearing loss, decreased eyesight, and renal failure.  9.  Status post living-related kidney transplant in 1993 after 2 failed   cadaveric kidney transplants.  10.  Squamous cell skin cancer.    RECOMMENDATIONS:  1.  He is n.p.o.  2.  He did receive Toradol yesterday, but no further NSAIDs or   anticoagulation.  3.  PCN and vancomycin allergy.  4.  Continue cefepime and metronidazole.  5.  ERCP today with Dr. Paula at 2:00 p.m.  I have gone over the procedure,   risks, and benefits with the patient and he gives me verbal consent.       ____________________________________     MD SUBHASH VARGAS / ABHISHEK    DD:  08/08/2019 10:21:22  DT:  08/08/2019 12:52:48    D#:  [id number]  Job#:  [id number]

## 2019-08-08 NOTE — ASSESSMENT & PLAN NOTE
This is sepsis (without associated acute organ dysfunction).   Related to cholangitis  Patient has been started on IV fluids per septic protocol  IV abx  blood cultures NTD

## 2019-08-08 NOTE — ANESTHESIA TIME REPORT
Anesthesia Start and Stop Event Times     Date Time Event    8/8/2019 1338 Ready for Procedure     1409 Anesthesia Start     1508 Anesthesia Stop        Responsible Staff  08/08/19    Name Role Begin End    Gaurav Dye M.D. Anesth 1409 1508        Preop Diagnosis (Free Text):  Pre-op Diagnosis     Ascending cholangitis.        Preop Diagnosis (Codes):    Post op Diagnosis  Cholangitis      Premium Reason  A. 3PM - 7AM    Comments:

## 2019-08-08 NOTE — ED PROVIDER NOTES
ED PROVIDER NOTE    Scribed for Mouna Adam M.D. by Balaji Hernandez. 8/8/2019  12:22 AM    Means of arrival: Ambulance  History obtained from: Patient  History limited by: None    CHIEF COMPLAINT  Chief Complaint   Patient presents with   • Sent by MD     transfer from [place] for sepsis and obstructed bile duct       HPI  Gerardo Treviño is a 44 y.o. male with past medical hx of left kidney transplant in 1992 and cholecystectomy 23 years ago who presents as a transfer from [place] for evaluation of acute right upper quadrant abdominal pain onset earlier today after arriving at his campsite in [place]. He endorses associated nausea, vomiting, and subjective fever. No alleviating or exacerbating factors are identified. Upon evaluation at [place], the patient was found to have concerning lab work with an elevated lactate and WBC. He additionally had a CT scan that indicated obstruction in his common bile duct, therefore he was transferred to [facility] for a higher level of care. The patient reports he did experience similar symptoms in 2009 and required admission to the hospital at that time. He denies associated shortness of breath.     Obtained and reviewed past medical records from [place] which indicates patient was transferred to [facility] for gastroenterologist and surgeon consult for ascending cholangitis, sepsis, and history of kidney injury. He initially presented to [place] for complaints of right upper quadrant abdominal pain, nausea, and vomiting. CT showed obstruction of distal CBD with CBD dilation and intrahepatically biliary dilatation. Air also noted on distal biliary radicals of the liver. Labs showed WBC of 28, platelet 325, creatinine 1.1, , , alk mgvg365, lipase 117, and lactate of 5. The patient was treated with Flagyl, levofloxacin , and 2L of normal saline at that time. He is currently on Tacrolimus Q12 and 7.5 mg of Prednisone daily.     REVIEW OF SYSTEMS  Pertinent positives:  "abdominal pain, nausea, vomiting, and subjective fever  Pertinent negatives: shortness of breath  10 + systems reviewed and otherwise negative    PAST MEDICAL HISTORY   has a past medical history of Renal disorder (1992).    SOCIAL HISTORY  Social History     Tobacco Use   • Smoking status: Never Smoker   • Smokeless tobacco: Never Used   Substance and Sexual Activity   • Alcohol use: Yes     Frequency: 2-3 times a week     Drinks per session: 1 or 2   • Drug use: Yes     Types: Inhaled     Comment: Marijuana occasionally   • Sexual activity: None noted       SURGICAL HISTORY  patient denies any surgical history    CURRENT MEDICATIONS  Home Medications     Reviewed by Lois Soriano R.N. (Registered Nurse) on 08/08/19 at 0003  Med List Status: <None>   Medication Last Dose Status        Patient Koffi Taking any Medications                       ALLERGIES  Allergies   Allergen Reactions   • Penicillins      Hives   • Vancomycin      Increased BP       PHYSICAL EXAM  VITAL SIGNS: /81   Pulse 97   Temp 37.2 °C (98.9 °F) (Temporal)   Resp 18   Ht 1.6 m (5' 3\")   Wt 74.8 kg (165 lb)   SpO2 95%   BMI 29.23 kg/m²   Pulse ox interpretation: I interpret this pulse ox as normal.  Constitutional: Alert in no apparent distress  HENT: Normocephalic, atraumatic. Bilateral external ears normal, Nose normal. Moist mucous membranes.  Eyes: Pupils are equal and reactive, Conjunctiva normal.   Neck: Normal range of motion, Supple, non-tender.   Lymphatic: No lymphadenopathy noted.   Cardiovascular: normal rate and rhythm, no murmurs. Distal pulses intact.  No peripheral edema. no JVD  Thorax & Lungs: normal breath sounds.  no wheezing/rales/ronchi. no increased work of breathing  Abdomen: Soft, non-distended, non tender to palpation. no peritoneal signs. no CVA tenderness. Left lower quadrant that overlies patient's kidney transplant with no tenderness.   Skin: Warm, Dry, No erythema, no rash.   Musculoskeletal: Good " range of motion in all major joints. No major deformities noted.  Neurologic:  Alert, EOMI, fluent speech, no facial droop, 5/5 strength BUE and BLE. Grossly intact neurologic exam.   Psychiatric: Affect normal, Judgment normal, Mood normal.     DIAGNOSTIC STUDIES / PROCEDURES      LABS  Results for orders placed or performed during the hospital encounter of 08/07/19   Lactic acid (lactate)   Result Value Ref Range    Lactic Acid 1.8 0.5 - 2.0 mmol/L   COMP METABOLIC PANEL   Result Value Ref Range    Sodium 140 135 - 145 mmol/L    Potassium 4.0 3.6 - 5.5 mmol/L    Chloride 109 96 - 112 mmol/L    Co2 19 (L) 20 - 33 mmol/L    Anion Gap 12.0 (H) 0.0 - 11.9    Glucose 125 (H) 65 - 99 mg/dL    Bun 17 8 - 22 mg/dL    Creatinine 1.03 0.50 - 1.40 mg/dL    Calcium 8.7 8.5 - 10.5 mg/dL    AST(SGOT) 282 (H) 12 - 45 U/L    ALT(SGPT) 379 (H) 2 - 50 U/L    Alkaline Phosphatase 78 30 - 99 U/L    Total Bilirubin 3.7 (H) 0.1 - 1.5 mg/dL    Albumin 3.5 3.2 - 4.9 g/dL    Total Protein 6.3 6.0 - 8.2 g/dL    Globulin 2.8 1.9 - 3.5 g/dL    A-G Ratio 1.3 g/dL   ESTIMATED GFR   Result Value Ref Range    GFR If African American >60 >60 mL/min/1.73 m 2    GFR If Non African American >60 >60 mL/min/1.73 m 2       RADIOLOGY  OUTSIDE IMAGES-DX CHEST   Final Result      OUTSIDE IMAGES-CT ABDOMEN /PELVIS   Final Result      DX-CHEST-PORTABLE (1 VIEW)   Final Result      No acute cardiopulmonary abnormality.          COURSE & MEDICAL DECISION MAKING  Nursing notes and vital signs were reviewed. (See chart for details)  The patient's records were reviewed, history was obtained from the patient.    12:22 AM - Patient seen and examined at bedside.  44-year-old male with history of recurrent ascending cholangitis in setting of immunosuppression with prednisone and tacrolimus for his kidney transplant many years ago.  Elevated white blood cell count at outside facility of 28, lactate of 5.  Status post antibiotics and IV fluids.  On examination here  patient feels much improved, vital signs are notable for borderline tachycardia, normotensive.  Afebrile.  Plan at this time to repeat labs, discussed with hospitalist as well as gastroenterology after lactate returns, continue IV fluids, continue n.p.o. status, broaden antibiotic coverage, and final level of care to be determined by repeat lactate and fluid responsiveness.      1:15 AM - Reviewed patient's labs which indicate a lactic acid of 1.8.  Remainder of labs pending.    1:24 AM I discussed the patient's case and the above findings with Dr. Diaz (Hospitalist) who agrees to admit the patient. I discussed the patient's case and the above findings with Dr. Nassar (GI) who was updated on the patient's case and recommends the patient be given Zosyn secondary to immunosuppression. He will see the patient in the morning.     1:36 AM - Discussed patient's allergies with pharmacy who recommends cefepime and flagyl.  Cefepime, 2 g IV, ordered.  Patient updated his plan for admission and is amenable.  Remainder of labs returned, notable for increased white blood cell count of 35.    DISPOSITION:  Patient will be admitted to Dr. Diaz (Hospitalist) in guarded condition.      FINAL IMPRESSION  (K83.09) Ascending cholangitis  (A41.9) Sepsis, due to unspecified organism (HCC)  (Z94.0) Renal transplant, status post     Balaji ALAN ([facility]), am scribing for, and in the presence of, Mouna Adam M.D..    Electronically signed by: Balaji Hernandez (Emerita), 8/8/2019    Mouna ALAN M.D. personally performed the services described in this documentation, as scribed by Balaji Hernandez in my presence, and it is both accurate and complete.    C.    The note accurately reflects work and decisions made by me.  Mouna Adam  8/8/2019  6:40 AM    This dictation was created using voice recognition software. The accuracy of the dictation is limited to the abilities of the software. I expect there may be some errors of  grammar and possibly content. The nursing notes were reviewed and certain aspects of this information were incorporated into this note.

## 2019-08-08 NOTE — ANESTHESIA PROCEDURE NOTES
Airway  Date/Time: 8/8/2019 2:16 PM  Performed by: Gaurav Dye M.D.  Authorized by: Gaurav Dye M.D.     Location:  OR  Urgency:  Elective  Difficult Airway: No    Indications for Airway Management:  Anesthesia  Spontaneous Ventilation: absent    Sedation Level:  Deep  Preoxygenated: Yes    Patient Position:  Sniffing  Mask Difficulty Assessment:  1 - vent by mask  Final Airway Type:  Endotracheal airway  Final Endotracheal Airway:  ETT  Cuffed: Yes    Technique Used for Successful ETT Placement:  Direct laryngoscopy  Insertion Site:  Oral  Blade Type:  Rebeca  Laryngoscope Blade/Videolaryngoscope Blade Size:  3  ETT Size (mm):  7.5  Measured from:  Teeth  ETT to Teeth (cm):  24  Placement Verified by: auscultation and capnometry    Cormack-Lehane Classification:  Grade IIa - partial view of glottis  Number of Attempts at Approach:  1

## 2019-08-08 NOTE — ED NOTES
Lab called with critical result of 35.5 WBC at 0125. Critical lab result read back to lab.   Dr. Adam notified of critical lab result at 0125.  Critical lab result read back by Dr. Adam.

## 2019-08-08 NOTE — CARE PLAN
Problem: Communication  Goal: The ability to communicate needs accurately and effectively will improve  Outcome: PROGRESSING AS EXPECTED     Problem: Safety  Goal: Will remain free from injury  Outcome: PROGRESSING AS EXPECTED  Goal: Will remain free from falls  Outcome: PROGRESSING AS EXPECTED     Problem: Venous Thromboembolism (VTW)/Deep Vein Thrombosis (DVT) Prevention:  Goal: Patient will participate in Venous Thrombosis (VTE)/Deep Vein Thrombosis (DVT)Prevention Measures  Outcome: PROGRESSING AS EXPECTED     Problem: Bowel/Gastric:  Goal: Normal bowel function is maintained or improved  Outcome: PROGRESSING AS EXPECTED  Goal: Will not experience complications related to bowel motility  Outcome: PROGRESSING AS EXPECTED     Problem: Knowledge Deficit  Goal: Knowledge of disease process/condition, treatment plan, diagnostic tests, and medications will improve  Outcome: PROGRESSING AS EXPECTED  Goal: Knowledge of the prescribed therapeutic regimen will improve  Outcome: PROGRESSING AS EXPECTED     Problem: Discharge Barriers/Planning  Goal: Patient's continuum of care needs will be met  Outcome: PROGRESSING AS EXPECTED     Problem: Fluid Volume:  Goal: Will maintain balanced intake and output  Outcome: PROGRESSING AS EXPECTED     Problem: Pain Management  Goal: Pain level will decrease to patient's comfort goal  Outcome: PROGRESSING AS EXPECTED

## 2019-08-08 NOTE — ED TRIAGE NOTES
"Gerardo Treviño     Chief Complaint   Patient presents with   • Sent by MD     transfer from [place] for sepsis and obstructed bile duct       Patient biba for above complaint. Patient was camping near [place] when he had increasing RUQ abdominal pain, nausea and vomiting. Patient was sent to [facility] where they found an obstructed bile duct. Patients lactate was 5, with a WBC of 28. Patient received flagyl, levo, and 2L NS. Patient had a total of 200mcg fentanyl for pain.    Patient has hx of gall stones and a kidney transplant in 1992.   Patient is AOx4 and declines pain at this time.     Blood Pressure: 113/81, Pulse: 97, Respiration: 18, Temperature: 37.2 °C (98.9 °F), Height: 160 cm (5' 3\"), Weight: 74.8 kg (165 lb), BMI (Calculated): 29.23, BSA (Calculated): 1.8, Pulse Oximetry: 95 %, O2 (LPM): 1, O2 Delivery: Nasal Cannula   "

## 2019-08-08 NOTE — PROGRESS NOTES
Pt back from OR. Pt alert and oriented x4. Pt denies pain. Call light within reach. Telemetry on. Post-op vitals obtained.

## 2019-08-09 PROBLEM — D72.829 LEUCOCYTOSIS: Status: ACTIVE | Noted: 2019-08-09

## 2019-08-09 PROBLEM — E87.6 HYPOKALEMIA: Status: ACTIVE | Noted: 2019-08-09

## 2019-08-09 PROBLEM — R74.8 ELEVATED LIVER ENZYMES: Status: ACTIVE | Noted: 2019-08-09

## 2019-08-09 LAB
ALBUMIN SERPL BCP-MCNC: 3.2 G/DL (ref 3.2–4.9)
ALBUMIN/GLOB SERPL: 1.1 G/DL
ALP SERPL-CCNC: 93 U/L (ref 30–99)
ALT SERPL-CCNC: 309 U/L (ref 2–50)
ANION GAP SERPL CALC-SCNC: 11 MMOL/L (ref 0–11.9)
AST SERPL-CCNC: 133 U/L (ref 12–45)
BASOPHILS # BLD AUTO: 0.2 % (ref 0–1.8)
BASOPHILS # BLD: 0.04 K/UL (ref 0–0.12)
BILIRUB SERPL-MCNC: 6 MG/DL (ref 0.1–1.5)
BUN SERPL-MCNC: 9 MG/DL (ref 8–22)
CALCIUM SERPL-MCNC: 9.2 MG/DL (ref 8.5–10.5)
CHLORIDE SERPL-SCNC: 107 MMOL/L (ref 96–112)
CO2 SERPL-SCNC: 22 MMOL/L (ref 20–33)
CREAT SERPL-MCNC: 0.92 MG/DL (ref 0.5–1.4)
EOSINOPHIL # BLD AUTO: 0.04 K/UL (ref 0–0.51)
EOSINOPHIL NFR BLD: 0.2 % (ref 0–6.9)
ERYTHROCYTE [DISTWIDTH] IN BLOOD BY AUTOMATED COUNT: 46.6 FL (ref 35.9–50)
GLOBULIN SER CALC-MCNC: 2.8 G/DL (ref 1.9–3.5)
GLUCOSE SERPL-MCNC: 111 MG/DL (ref 65–99)
HCT VFR BLD AUTO: 41.2 % (ref 42–52)
HGB BLD-MCNC: 13.1 G/DL (ref 14–18)
IMM GRANULOCYTES # BLD AUTO: 0.08 K/UL (ref 0–0.11)
IMM GRANULOCYTES NFR BLD AUTO: 0.5 % (ref 0–0.9)
LYMPHOCYTES # BLD AUTO: 0.66 K/UL (ref 1–4.8)
LYMPHOCYTES NFR BLD: 3.8 % (ref 22–41)
MCH RBC QN AUTO: 28.6 PG (ref 27–33)
MCHC RBC AUTO-ENTMCNC: 31.8 G/DL (ref 33.7–35.3)
MCV RBC AUTO: 90 FL (ref 81.4–97.8)
MONOCYTES # BLD AUTO: 1.17 K/UL (ref 0–0.85)
MONOCYTES NFR BLD AUTO: 6.7 % (ref 0–13.4)
NEUTROPHILS # BLD AUTO: 15.59 K/UL (ref 1.82–7.42)
NEUTROPHILS NFR BLD: 88.6 % (ref 44–72)
NRBC # BLD AUTO: 0 K/UL
NRBC BLD-RTO: 0 /100 WBC
PLATELET # BLD AUTO: 214 K/UL (ref 164–446)
PMV BLD AUTO: 10 FL (ref 9–12.9)
POTASSIUM SERPL-SCNC: 3.1 MMOL/L (ref 3.6–5.5)
PROT SERPL-MCNC: 6 G/DL (ref 6–8.2)
RBC # BLD AUTO: 4.58 M/UL (ref 4.7–6.1)
SODIUM SERPL-SCNC: 140 MMOL/L (ref 135–145)
WBC # BLD AUTO: 17.6 K/UL (ref 4.8–10.8)

## 2019-08-09 PROCEDURE — 700102 HCHG RX REV CODE 250 W/ 637 OVERRIDE(OP): Performed by: INTERNAL MEDICINE

## 2019-08-09 PROCEDURE — A9270 NON-COVERED ITEM OR SERVICE: HCPCS | Performed by: INTERNAL MEDICINE

## 2019-08-09 PROCEDURE — 770020 HCHG ROOM/CARE - TELE (206)

## 2019-08-09 PROCEDURE — 700111 HCHG RX REV CODE 636 W/ 250 OVERRIDE (IP): Performed by: INTERNAL MEDICINE

## 2019-08-09 PROCEDURE — 700105 HCHG RX REV CODE 258: Performed by: INTERNAL MEDICINE

## 2019-08-09 PROCEDURE — 99233 SBSQ HOSP IP/OBS HIGH 50: CPT | Performed by: INTERNAL MEDICINE

## 2019-08-09 PROCEDURE — 36415 COLL VENOUS BLD VENIPUNCTURE: CPT

## 2019-08-09 PROCEDURE — 80053 COMPREHEN METABOLIC PANEL: CPT

## 2019-08-09 PROCEDURE — 700101 HCHG RX REV CODE 250: Performed by: INTERNAL MEDICINE

## 2019-08-09 PROCEDURE — 85025 COMPLETE CBC W/AUTO DIFF WBC: CPT

## 2019-08-09 RX ORDER — POTASSIUM CHLORIDE 20 MEQ/1
40 TABLET, EXTENDED RELEASE ORAL ONCE
Status: COMPLETED | OUTPATIENT
Start: 2019-08-09 | End: 2019-08-09

## 2019-08-09 RX ORDER — METRONIDAZOLE 500 MG/1
500 TABLET ORAL EVERY 8 HOURS
Status: DISCONTINUED | OUTPATIENT
Start: 2019-08-09 | End: 2019-08-11 | Stop reason: HOSPADM

## 2019-08-09 RX ORDER — SODIUM CHLORIDE 9 MG/ML
INJECTION, SOLUTION INTRAVENOUS CONTINUOUS
Status: DISCONTINUED | OUTPATIENT
Start: 2019-08-09 | End: 2019-08-11 | Stop reason: HOSPADM

## 2019-08-09 RX ADMIN — CEFEPIME 2 G: 2 INJECTION, POWDER, FOR SOLUTION INTRAVENOUS at 06:31

## 2019-08-09 RX ADMIN — POTASSIUM CHLORIDE 40 MEQ: 20 TABLET, EXTENDED RELEASE ORAL at 09:55

## 2019-08-09 RX ADMIN — METRONIDAZOLE 500 MG: 500 TABLET, FILM COATED ORAL at 14:54

## 2019-08-09 RX ADMIN — TACROLIMUS 1 MG: 1 CAPSULE ORAL at 18:16

## 2019-08-09 RX ADMIN — SODIUM CHLORIDE, POTASSIUM CHLORIDE, SODIUM LACTATE AND CALCIUM CHLORIDE: 600; 310; 30; 20 INJECTION, SOLUTION INTRAVENOUS at 03:17

## 2019-08-09 RX ADMIN — SENNOSIDES, DOCUSATE SODIUM 2 TABLET: 50; 8.6 TABLET, FILM COATED ORAL at 18:16

## 2019-08-09 RX ADMIN — CEFTRIAXONE SODIUM 2 G: 2 INJECTION, POWDER, FOR SOLUTION INTRAMUSCULAR; INTRAVENOUS at 11:07

## 2019-08-09 RX ADMIN — METRONIDAZOLE 500 MG: 500 INJECTION, SOLUTION INTRAVENOUS at 05:37

## 2019-08-09 RX ADMIN — TACROLIMUS 1 MG: 1 CAPSULE ORAL at 05:37

## 2019-08-09 RX ADMIN — SODIUM CHLORIDE: 9 INJECTION, SOLUTION INTRAVENOUS at 18:17

## 2019-08-09 RX ADMIN — PREDNISONE 7.5 MG: 5 TABLET ORAL at 05:37

## 2019-08-09 RX ADMIN — METRONIDAZOLE 500 MG: 500 TABLET, FILM COATED ORAL at 21:22

## 2019-08-09 RX ADMIN — SODIUM CHLORIDE: 9 INJECTION, SOLUTION INTRAVENOUS at 11:08

## 2019-08-09 ASSESSMENT — ENCOUNTER SYMPTOMS
HEADACHES: 0
BACK PAIN: 0
FEVER: 0
ABDOMINAL PAIN: 1
COUGH: 0
MYALGIAS: 0
BLURRED VISION: 0
DIARRHEA: 0
NAUSEA: 0
CHILLS: 0
PALPITATIONS: 0
INSOMNIA: 0
NERVOUS/ANXIOUS: 0
DEPRESSION: 0
EYE DISCHARGE: 0
SPUTUM PRODUCTION: 0
SHORTNESS OF BREATH: 0
NECK PAIN: 0
HEARTBURN: 0
WEIGHT LOSS: 0
EYE REDNESS: 0
FOCAL WEAKNESS: 0
EYE PAIN: 0
ORTHOPNEA: 0
SEIZURES: 0
VOMITING: 0
STRIDOR: 0
DIZZINESS: 0

## 2019-08-09 NOTE — PROGRESS NOTES
Hospital Medicine Daily Progress Note    Date of Service  8/9/2019    Chief Complaint  44 y.o. male admitted 8/7/2019 with abdominal pain    Hospital Course    43 y/o M with PMH of cholangitis, came in with above and found to have cholangitis.       Interval Problem Update  He had ERCP done yesterday with stent placement and stone removal and drainage of pus. Pain is tolerable. Updated him about his labs status. Continue IV abx. Patient was seen and examined by me today. Case was discussed with RN and multidisplinary team during rounds. Denies nausea, vomiting, diarrhea.     Consultants/Specialty  gI    Code Status  full    Disposition  Remain on the floor    Review of Systems  Review of Systems   Constitutional: Negative for chills, fever and weight loss.   HENT: Negative for congestion and nosebleeds.    Eyes: Negative for blurred vision, pain, discharge and redness.   Respiratory: Negative for cough, sputum production, shortness of breath and stridor.    Cardiovascular: Negative for chest pain, palpitations and orthopnea.   Gastrointestinal: Positive for abdominal pain. Negative for diarrhea, heartburn, nausea and vomiting.   Genitourinary: Negative for dysuria, frequency and urgency.   Musculoskeletal: Negative for back pain, myalgias and neck pain.   Skin: Negative for itching and rash.   Neurological: Negative for dizziness, focal weakness, seizures and headaches.   Psychiatric/Behavioral: Negative for depression. The patient is not nervous/anxious and does not have insomnia.         Physical Exam  Temp:  [36 °C (96.8 °F)-37.8 °C (100.1 °F)] 37 °C (98.6 °F)  Pulse:  [] 84  Resp:  [16-24] 16  BP: ()/(50-90) 128/78  SpO2:  [91 %-99 %] 92 %    Physical Exam   Constitutional: He is oriented to person, place, and time. No distress.   HENT:   Head: Normocephalic and atraumatic.   Mouth/Throat: Oropharynx is clear and moist.   Eyes: Pupils are equal, round, and reactive to light. Conjunctivae and EOM are  normal.   Neck: Normal range of motion. Neck supple. No tracheal deviation present. No thyromegaly present.   Cardiovascular: Normal rate and regular rhythm.   No murmur heard.  Pulmonary/Chest: Effort normal and breath sounds normal. No respiratory distress. He has no wheezes.   Abdominal: Soft. Bowel sounds are normal. He exhibits no distension. There is tenderness.   Musculoskeletal: He exhibits no edema or tenderness.   Neurological: He is alert and oriented to person, place, and time. No cranial nerve deficit.   Skin: Skin is warm and dry. He is not diaphoretic. No erythema.   Psychiatric: He has a normal mood and affect. His behavior is normal. Thought content normal.   Nursing note and vitals reviewed.      Fluids    Intake/Output Summary (Last 24 hours) at 8/9/2019 0735  Last data filed at 8/9/2019 0647  Gross per 24 hour   Intake 2437.5 ml   Output 500 ml   Net 1937.5 ml       Laboratory  Recent Labs     08/08/19 0055 08/09/19  0215   WBC 35.5* 17.6*   RBC 4.64* 4.58*   HEMOGLOBIN 13.6* 13.1*   HEMATOCRIT 40.8* 41.2*   MCV 87.9 90.0   MCH 29.3 28.6   MCHC 33.3* 31.8*   RDW 43.9 46.6   PLATELETCT 256 214   MPV 9.5 10.0     Recent Labs     08/08/19 0055 08/09/19  0215   SODIUM 140 140   POTASSIUM 4.0 3.1*   CHLORIDE 109 107   CO2 19* 22   GLUCOSE 125* 111*   BUN 17 9   CREATININE 1.03 0.92   CALCIUM 8.7 9.2     Recent Labs     08/08/19  0055   APTT 24.0*   INR 1.16*               Imaging  DX-PORTABLE FLUORO > 1 HOUR   Preliminary Result      Portable fluoroscopy utilized for 4 minutes 30 seconds         INTERPRETING LOCATION: [address] [place] [address], [id number]      OUTSIDE IMAGES-DX CHEST   Final Result      OUTSIDE IMAGES-CT ABDOMEN /PELVIS   Final Result      DX-CHEST-PORTABLE (1 VIEW)   Final Result      No acute cardiopulmonary abnormality.           Assessment/Plan  Elevated liver enzymes  Assessment & Plan  Related to cbd stones  Post ERCP  Improving  But bilirubin went up to 6 from 3  Follow cmp in  am    Hypokalemia  Assessment & Plan  Worsening  Replete as needed  Follow cmp in am    Leucocytosis- (present on admission)  Assessment & Plan  Wbc 17 today  Improving  Follow cbc in am    Immunosuppression (HCC)- (present on admission)  Assessment & Plan  Due to prednisone and tacrolimus    Kidney transplanted- (present on admission)  Assessment & Plan  Continue prednisone and tacrolimus    Sepsis (HCC)- (present on admission)  Assessment & Plan  This is sepsis (without associated acute organ dysfunction).   Related to cholangitis  Patient has been started on IV fluids per septic protocol  IV abx  Follow blood cultures      Ascending cholangitis- (present on admission)  Assessment & Plan  ERCP 8/8 with stone removal, pus drainage and stent placement  On IV abx with ceftriaxone and metronidazole  Pain control  Follow culture  GI following  Follow cmp in am       VTE prophylaxis: lovenox

## 2019-08-09 NOTE — PROGRESS NOTES
Gastroenterology Progress Note     Author: Chelsea Wiley   Date & Time Created: 8/9/2019 3:14 PM    Chief Complaint:  GI follow up ascending cholangitis    Interval History:  HISTORY OF PRESENT ILLNESS:  The patient is a 44-year-old male who underwent   an initial cadaveric renal transplant in 1993 that was rejected within 24   hours, a second cadaveric transplant that was rejected in 48 hours, and then a   living-related kidney transplant.  Prior to that, he was on continuous   ambulatory peritoneal dialysis and had several episodes of peritonitis.  He   was on cyclosporine until 2015 and was diagnosed with a squamous cell skin   cancer.  He was then changed to Prograf.  He has been on prednisone since   transplant.  He has not had a bone density exam.     He lives in [place], [place] and was camping at [place].  He had been   having some minor right upper quadrant pain, but on Tuesday, 08/06, the pain   increased in intensity.  He had associated fevers, chills, nausea and   vomiting.  Yesterday, the pain was unbearable and was radiating into his back.    He was seen locally in the [place] area and then transferred here to [facility]   for a higher level of care.  Currently, he has not had vomiting, not having   fever or chills, did have mild nausea this morning, and his pain is much   improved.     In 12/2010, he presented to [facility] in [place] with right upper quadrant pain   and findings of ascending cholangitis.  He had a cholecystectomy in 1996.  He   had an ERCP in 12/2010, but due to the cholangitis, intervention was not   performed.  Then, in 01/2011, he underwent ERCP with Dr. Barney and was found to   have a large common bile duct stone, 1.32 to 2.5 cm in size.  He had 2   subcentimeter stones.  He had a sphincterotomy and a sphincteroplasty to 10   mm.  They attempted balloon extraction, but that was unsuccessful.  He had a   partial lithotripsy.  They were unable to extract all the stone remnants  and 2   plastic stents were placed.  A third ERCP was performed in 2011 where he   did have mechanical lithotripsy, balloon extraction of fragments, and there   was no defect noted on the occlusion cholangiogram.  The patient has not had   any problems with right upper quadrant pain, cholangitis until present.    Yesterday, his total bilirubin was 1.8, alkaline phosphatase 100, , ALT   286, and his WBC was 28.    8:  Feeling better.  No fever or chills but temp 100 last night.  Tolerating regular diet        Review of Systems:  ROS    Physical Exam:  Physical Exam    Labs:          Recent Labs     19   SODIUM 140 140   POTASSIUM 4.0 3.1*   CHLORIDE 109 107   CO2 19* 22   BUN 17 9   CREATININE 1.03 0.92   CALCIUM 8.7 9.2     Recent Labs     19   ALTSGPT 379* 309*   ASTSGOT 282* 133*   ALKPHOSPHAT 78 93   TBILIRUBIN 3.7* 6.0*   GLUCOSE 125* 111*     Recent Labs     19   RBC 4.64* 4.58*   HEMOGLOBIN 13.6* 13.1*   HEMATOCRIT 40.8* 41.2*   PLATELETCT 256 214   PROTHROMBTM 15.1*  --    APTT 24.0*  --    INR 1.16*  --      Recent Labs     19   WBC 35.5* 17.6*   NEUTSPOLYS 84.60* 88.60*   LYMPHOCYTES 0.00* 3.80*   MONOCYTES 4.30 6.70   EOSINOPHILS 0.00 0.20   BASOPHILS 0.00 0.20   ASTSGOT 282* 133*   ALTSGPT 379* 309*   ALKPHOSPHAT 78 93   TBILIRUBIN 3.7* 6.0*     Hemodynamics:  Temp (24hrs), Av.1 °C (98.8 °F), Min:36.3 °C (97.3 °F), Max:37.8 °C (100.1 °F)  Temperature: 36.3 °C (97.3 °F)  Pulse  Av.3  Min: 77  Max: 109   Blood Pressure: 118/77     Respiratory:    Respiration: 17, Pulse Oximetry: 94 %           Fluids:    Intake/Output Summary (Last 24 hours) at 2019 1514  Last data filed at 2019 0647  Gross per 24 hour   Intake 1737.5 ml   Output --   Net 1737.5 ml     Weight: 70.8 kg (156 lb 1.4 oz)  GI/Nutrition:  Orders Placed This Encounter   Procedures   • Diet Order Regular      Standing Status:   Standing     Number of Occurrences:   1     Order Specific Question:   Diet:     Answer:   Regular [1]     Medical Decision Making, by Problem:  Active Hospital Problems    Diagnosis   • Leucocytosis [D72.829]   • Hypokalemia [E87.6]   • Elevated liver enzymes [R74.8]   • Ascending cholangitis [K83.09]   • Sepsis (HCC) [A41.9]   • Kidney transplanted [Z94.0]   • Immunosuppression (HCC) [D89.9]     IMPRESSION:  1.  Ascending cholangitis.  ERCP:  Impressions:  Ascending cholangitis with pus as well as multiple stones and sludge removed from the dilated common bile duct, Placement of 10Fr x 7cm plastic common bile duct stent  3.  Abnormal liver enzymes.  4.  Leukocytosis with bandemia.  5.  Right upper quadrant pain, improved.  6.  History of choledocholithiasis in 2010 and 2011, status post endoscopic   retrograde cholangiopancreatography x3 requiring mechanical lithotripsy.  7.  Status post cholecystectomy in 1996.  8.  Alport syndrome with hearing loss, decreased eyesight, and renal failure.  9.  Status post living-related kidney transplant in 1993 after 2 failed   cadaveric kidney transplants.  10.  Squamous cell skin cancer.    Plan:  Not sure what etiology of increased bilirubin but will recheck in am.  Possibly he may still have retained stone(s)  Continue abx  Continue Prograf and Prednisone   Agree with regular diet    Quality-Core Measures

## 2019-08-09 NOTE — PROGRESS NOTES
Notified Dr Zee that this RN received call from outside [facility] about one set of blood cultures resulting as positive for Gram negative rods within 2 days. Physician aware.

## 2019-08-10 LAB
ALBUMIN SERPL BCP-MCNC: 3.4 G/DL (ref 3.2–4.9)
ALBUMIN/GLOB SERPL: 1.2 G/DL
ALP SERPL-CCNC: 117 U/L (ref 30–99)
ALT SERPL-CCNC: 276 U/L (ref 2–50)
ANION GAP SERPL CALC-SCNC: 9 MMOL/L (ref 0–11.9)
AST SERPL-CCNC: 85 U/L (ref 12–45)
BACTERIA UR CULT: NORMAL
BASOPHILS # BLD AUTO: 0.4 % (ref 0–1.8)
BASOPHILS # BLD: 0.05 K/UL (ref 0–0.12)
BILIRUB SERPL-MCNC: 1.8 MG/DL (ref 0.1–1.5)
BUN SERPL-MCNC: 9 MG/DL (ref 8–22)
CALCIUM SERPL-MCNC: 9.6 MG/DL (ref 8.5–10.5)
CHLORIDE SERPL-SCNC: 109 MMOL/L (ref 96–112)
CO2 SERPL-SCNC: 25 MMOL/L (ref 20–33)
CREAT SERPL-MCNC: 0.66 MG/DL (ref 0.5–1.4)
EOSINOPHIL # BLD AUTO: 0.13 K/UL (ref 0–0.51)
EOSINOPHIL NFR BLD: 0.9 % (ref 0–6.9)
ERYTHROCYTE [DISTWIDTH] IN BLOOD BY AUTOMATED COUNT: 47.2 FL (ref 35.9–50)
GLOBULIN SER CALC-MCNC: 2.9 G/DL (ref 1.9–3.5)
GLUCOSE SERPL-MCNC: 122 MG/DL (ref 65–99)
HCT VFR BLD AUTO: 44.2 % (ref 42–52)
HGB BLD-MCNC: 13.8 G/DL (ref 14–18)
IMM GRANULOCYTES # BLD AUTO: 0.07 K/UL (ref 0–0.11)
IMM GRANULOCYTES NFR BLD AUTO: 0.5 % (ref 0–0.9)
LYMPHOCYTES # BLD AUTO: 1.67 K/UL (ref 1–4.8)
LYMPHOCYTES NFR BLD: 11.9 % (ref 22–41)
MCH RBC QN AUTO: 28 PG (ref 27–33)
MCHC RBC AUTO-ENTMCNC: 31.2 G/DL (ref 33.7–35.3)
MCV RBC AUTO: 89.8 FL (ref 81.4–97.8)
MONOCYTES # BLD AUTO: 0.83 K/UL (ref 0–0.85)
MONOCYTES NFR BLD AUTO: 5.9 % (ref 0–13.4)
NEUTROPHILS # BLD AUTO: 11.31 K/UL (ref 1.82–7.42)
NEUTROPHILS NFR BLD: 80.4 % (ref 44–72)
NRBC # BLD AUTO: 0 K/UL
NRBC BLD-RTO: 0 /100 WBC
PLATELET # BLD AUTO: 239 K/UL (ref 164–446)
PMV BLD AUTO: 10 FL (ref 9–12.9)
POTASSIUM SERPL-SCNC: 3.4 MMOL/L (ref 3.6–5.5)
PROT SERPL-MCNC: 6.3 G/DL (ref 6–8.2)
RBC # BLD AUTO: 4.92 M/UL (ref 4.7–6.1)
SIGNIFICANT IND 70042: NORMAL
SITE SITE: NORMAL
SODIUM SERPL-SCNC: 143 MMOL/L (ref 135–145)
SOURCE SOURCE: NORMAL
WBC # BLD AUTO: 14.1 K/UL (ref 4.8–10.8)

## 2019-08-10 PROCEDURE — 36415 COLL VENOUS BLD VENIPUNCTURE: CPT

## 2019-08-10 PROCEDURE — 700111 HCHG RX REV CODE 636 W/ 250 OVERRIDE (IP): Performed by: INTERNAL MEDICINE

## 2019-08-10 PROCEDURE — 80053 COMPREHEN METABOLIC PANEL: CPT

## 2019-08-10 PROCEDURE — 700102 HCHG RX REV CODE 250 W/ 637 OVERRIDE(OP): Performed by: INTERNAL MEDICINE

## 2019-08-10 PROCEDURE — 770020 HCHG ROOM/CARE - TELE (206)

## 2019-08-10 PROCEDURE — A9270 NON-COVERED ITEM OR SERVICE: HCPCS | Performed by: INTERNAL MEDICINE

## 2019-08-10 PROCEDURE — 700105 HCHG RX REV CODE 258: Performed by: INTERNAL MEDICINE

## 2019-08-10 PROCEDURE — 99232 SBSQ HOSP IP/OBS MODERATE 35: CPT | Performed by: INTERNAL MEDICINE

## 2019-08-10 PROCEDURE — 85025 COMPLETE CBC W/AUTO DIFF WBC: CPT

## 2019-08-10 RX ADMIN — PREDNISONE 7.5 MG: 5 TABLET ORAL at 05:49

## 2019-08-10 RX ADMIN — TACROLIMUS 1 MG: 1 CAPSULE ORAL at 05:49

## 2019-08-10 RX ADMIN — SODIUM CHLORIDE: 9 INJECTION, SOLUTION INTRAVENOUS at 14:18

## 2019-08-10 RX ADMIN — SODIUM CHLORIDE: 9 INJECTION, SOLUTION INTRAVENOUS at 21:13

## 2019-08-10 RX ADMIN — SENNOSIDES, DOCUSATE SODIUM 2 TABLET: 50; 8.6 TABLET, FILM COATED ORAL at 05:49

## 2019-08-10 RX ADMIN — METRONIDAZOLE 500 MG: 500 TABLET, FILM COATED ORAL at 13:07

## 2019-08-10 RX ADMIN — METRONIDAZOLE 500 MG: 500 TABLET, FILM COATED ORAL at 05:50

## 2019-08-10 RX ADMIN — METRONIDAZOLE 500 MG: 500 TABLET, FILM COATED ORAL at 21:10

## 2019-08-10 RX ADMIN — TACROLIMUS 1 MG: 1 CAPSULE ORAL at 16:58

## 2019-08-10 RX ADMIN — CEFTRIAXONE SODIUM 2 G: 2 INJECTION, POWDER, FOR SOLUTION INTRAMUSCULAR; INTRAVENOUS at 05:49

## 2019-08-10 ASSESSMENT — ENCOUNTER SYMPTOMS
SPUTUM PRODUCTION: 0
MYALGIAS: 0
SHORTNESS OF BREATH: 0
DIZZINESS: 0
ABDOMINAL PAIN: 1
EYE PAIN: 0
DIARRHEA: 0
BACK PAIN: 0
NERVOUS/ANXIOUS: 0
NAUSEA: 0
PALPITATIONS: 0
WEIGHT LOSS: 0
ABDOMINAL PAIN: 0
EYE DISCHARGE: 0
VOMITING: 0
NECK PAIN: 0
DEPRESSION: 0
EYE REDNESS: 0
CHILLS: 0
FOCAL WEAKNESS: 0
FEVER: 0
COUGH: 0
HEADACHES: 0

## 2019-08-10 NOTE — CARE PLAN
Problem: Safety  Goal: Will remain free from injury  Outcome: PROGRESSING AS EXPECTED  Note:   Reinforced safety and fall prevention education. Fall precautions in place with treaded socks on, bed locked and in lowest position, belongings/call light within reach. Appropriate signage placed outside door. Reinforced use of call light for assistance and patient verbalized understanding.      Problem: Bowel/Gastric:  Goal: Normal bowel function is maintained or improved  Outcome: PROGRESSING AS EXPECTED  Flowsheets (Taken 8/10/2019 0810)  Last BM: 08/09/19 (Per Pt report)  Number of Times Stooled: 1  Note:   Pt is ambulatory and received senna docusate which he stated helped him have a Bm. Pt is toileted frequently and education was reinforced on using call light for assistance. Patient verbalized understanding.

## 2019-08-10 NOTE — PROGRESS NOTES
Pt resting comfortably in bed watching TV. On RA. A&Ox4.  Pt noticed IV leaking. This RN tightened all connections and reinforced dressing. Pt had no other complaints. Will monitor.

## 2019-08-10 NOTE — PROGRESS NOTES
"Bedside report received and patient care assumed. Pt is A&Ox4 and has tele box on. Patient resting in bed with pain 3/10, stating \"nothing out of the ordinary,\" no tenderness to palpation. Pt updated on POC with no questions or concerns. Fall precautions in place with treaded socks on, bed locked and in lowest position, belongings/call light within reach. Will continue to monitor.   "

## 2019-08-10 NOTE — PROGRESS NOTES
Hospital Medicine Daily Progress Note    Date of Service  8/10/2019    Chief Complaint  44 y.o. male admitted 8/7/2019 with abdominal pain    Hospital Course    43 y/o M with PMH of cholangitis, came in with above and found to have cholangitis.       Interval Problem Update  Feeling better each day. Pain is minimal. Tolerating diet and having bowel movement. Updated hi about his labs status and answered all the questions he had. Patient was seen and examined by me today. Case was discussed with RN and multidisplinary team during rounds. Denies nausea, vomiting, diarrhea.     Consultants/Specialty  gI    Code Status  full    Disposition  Remain on the floor    Review of Systems  Review of Systems   Constitutional: Negative for chills and weight loss.   HENT: Negative for congestion and nosebleeds.    Eyes: Negative for pain, discharge and redness.   Respiratory: Negative for cough, sputum production and shortness of breath.    Cardiovascular: Negative for chest pain and palpitations.   Gastrointestinal: Positive for abdominal pain. Negative for diarrhea, nausea and vomiting.   Genitourinary: Negative for frequency and urgency.   Musculoskeletal: Negative for back pain, myalgias and neck pain.   Skin: Negative for itching and rash.   Neurological: Negative for dizziness, focal weakness and headaches.   Psychiatric/Behavioral: Negative for depression. The patient is not nervous/anxious.         Physical Exam  Temp:  [36.3 °C (97.3 °F)-37 °C (98.6 °F)] 36.9 °C (98.4 °F)  Pulse:  [77-90] 84  Resp:  [14-18] 18  BP: (118-132)/(71-89) 122/89  SpO2:  [92 %-94 %] 93 %    Physical Exam   Constitutional: He is oriented to person, place, and time. No distress.   HENT:   Head: Atraumatic.   Mouth/Throat: Oropharynx is clear and moist.   Eyes: Pupils are equal, round, and reactive to light. EOM are normal.   Neck: Normal range of motion. No tracheal deviation present. No thyromegaly present.   Cardiovascular: Normal rate and  regular rhythm.   Pulmonary/Chest: Effort normal. No respiratory distress. He has no wheezes.   Abdominal: Soft. He exhibits no distension. There is tenderness.   Musculoskeletal: He exhibits no edema or tenderness.   Neurological: He is alert and oriented to person, place, and time. No cranial nerve deficit.   Skin: Skin is warm and dry. He is not diaphoretic. No erythema.   Psychiatric: He has a normal mood and affect. Thought content normal.   Nursing note and vitals reviewed.      Fluids  No intake or output data in the 24 hours ending 08/10/19 0737    Laboratory  Recent Labs     08/08/19 0055 08/09/19 0215 08/10/19  0246   WBC 35.5* 17.6* 14.1*   RBC 4.64* 4.58* 4.92   HEMOGLOBIN 13.6* 13.1* 13.8*   HEMATOCRIT 40.8* 41.2* 44.2   MCV 87.9 90.0 89.8   MCH 29.3 28.6 28.0   MCHC 33.3* 31.8* 31.2*   RDW 43.9 46.6 47.2   PLATELETCT 256 214 239   MPV 9.5 10.0 10.0     Recent Labs     08/08/19  0055 08/09/19  0215 08/10/19  0246   SODIUM 140 140 143   POTASSIUM 4.0 3.1* 3.4*   CHLORIDE 109 107 109   CO2 19* 22 25   GLUCOSE 125* 111* 122*   BUN 17 9 9   CREATININE 1.03 0.92 0.66   CALCIUM 8.7 9.2 9.6     Recent Labs     08/08/19  0055   APTT 24.0*   INR 1.16*               Imaging  DX-PORTABLE FLUORO > 1 HOUR   Preliminary Result      Portable fluoroscopy utilized for 4 minutes 30 seconds         INTERPRETING LOCATION: [address] [place] [address], [id number]      OUTSIDE IMAGES-DX CHEST   Final Result      OUTSIDE IMAGES-CT ABDOMEN /PELVIS   Final Result      DX-CHEST-PORTABLE (1 VIEW)   Final Result      No acute cardiopulmonary abnormality.           Assessment/Plan  Elevated liver enzymes  Assessment & Plan  Related to cbd stones  Post ERCP day 2  Improving  Follow cmp in am    Hypokalemia  Assessment & Plan  Improving with K 3.4  Replete as needed  Follow cmp in am    Leucocytosis- (present on admission)  Assessment & Plan  Wbc 14 today  Improving  Follow cbc in am    Immunosuppression (HCC)- (present on  admission)  Assessment & Plan  Due to prednisone and tacrolimus    Kidney transplanted- (present on admission)  Assessment & Plan  Continue prednisone and tacrolimus    Sepsis (HCC)- (present on admission)  Assessment & Plan  This is sepsis (without associated acute organ dysfunction).   Related to cholangitis  Patient has been started on IV fluids per septic protocol  IV abx  blood cultures NTD      Ascending cholangitis- (present on admission)  Assessment & Plan  ERCP 8/8 with stone removal, pus drainage and stent placement  On IV abx with ceftriaxone and metronidazole  Pain control  Follow culture: NTD  GI following  Follow cmp in am       VTE prophylaxis: lovenox

## 2019-08-10 NOTE — PROGRESS NOTES
Received bedside report from RN, pt care assumed, VSS. Pt AAOx4, On RA c/o 1/10 pain at this time. No signs of acute distress noted at this time. POC discussed with pt and verbalizes no questions. Pt denies any additional needs at this time. Bed in lowest position, bed alarm off, pt ambulatory, pt educated on fall risk and verbalized understanding, call light within reach, hourly rounding initiated.

## 2019-08-10 NOTE — PROGRESS NOTES
Gastroenterology Progress Note     Author: Chelsea Wiley   Date & Time Created: 8/10/2019 3:22 PM    Chief Complaint:  GI follow up ascending cholangitis    Interval History:  HISTORY OF PRESENT ILLNESS:  The patient is a 44-year-old male who underwent   an initial cadaveric renal transplant in 1993 that was rejected within 24   hours, a second cadaveric transplant that was rejected in 48 hours, and then a   living-related kidney transplant.  Prior to that, he was on continuous   ambulatory peritoneal dialysis and had several episodes of peritonitis.  He   was on cyclosporine until 2015 and was diagnosed with a squamous cell skin   cancer.  He was then changed to Prograf.  He has been on prednisone since   transplant.  He has not had a bone density exam.     He lives in [place], [place] and was camping at [place].  He had been   having some minor right upper quadrant pain, but on Tuesday, 08/06, the pain   increased in intensity.  He had associated fevers, chills, nausea and   vomiting.  Yesterday, the pain was unbearable and was radiating into his back.    He was seen locally in the [place] area and then transferred here to [facility]   for a higher level of care.  Currently, he has not had vomiting, not having   fever or chills, did have mild nausea this morning, and his pain is much   improved.     In 12/2010, he presented to [facility] in [place] with right upper quadrant pain   and findings of ascending cholangitis.  He had a cholecystectomy in 1996.  He   had an ERCP in 12/2010, but due to the cholangitis, intervention was not   performed.  Then, in 01/2011, he underwent ERCP with Dr. Barney and was found to   have a large common bile duct stone, 1.32 to 2.5 cm in size.  He had 2   subcentimeter stones.  He had a sphincterotomy and a sphincteroplasty to 10   mm.  They attempted balloon extraction, but that was unsuccessful.  He had a   partial lithotripsy.  They were unable to extract all the stone remnants  and 2   plastic stents were placed.  A third ERCP was performed in 03/2011 where he   did have mechanical lithotripsy, balloon extraction of fragments, and there   was no defect noted on the occlusion cholangiogram.  The patient has not had   any problems with right upper quadrant pain, cholangitis until present.    Yesterday, his total bilirubin was 1.8, alkaline phosphatase 100, , ALT   286, and his WBC was 28.    8/9/19:  Feeling better.  No fever or chills but temp 100 last night.  Tolerating regular diet  8/10/19:  Feeling well.   Looking forward to going home tomorrow.  Mild sore throat related to procedure.  He agrees to contact his GI since he has a temp biliary stent        Review of Systems:  Review of Systems   Constitutional: Negative for chills and fever.   Respiratory: Negative for shortness of breath.    Cardiovascular: Negative for chest pain.   Gastrointestinal: Negative for abdominal pain.   Neurological: Negative for dizziness.   All other systems reviewed and are negative.      Physical Exam:  Physical Exam   Constitutional: He is oriented to person, place, and time. He appears well-developed and well-nourished.   Neck: Normal range of motion. Neck supple.   Cardiovascular: Normal rate and regular rhythm.   Pulmonary/Chest: Effort normal and breath sounds normal.   Abdominal: Soft. Bowel sounds are normal.   Musculoskeletal: Normal range of motion.   Neurological: He is alert and oriented to person, place, and time.   Skin: Skin is warm and dry.       Labs:          Recent Labs     08/08/19 0055 08/09/19 0215 08/10/19  0246   SODIUM 140 140 143   POTASSIUM 4.0 3.1* 3.4*   CHLORIDE 109 107 109   CO2 19* 22 25   BUN 17 9 9   CREATININE 1.03 0.92 0.66   CALCIUM 8.7 9.2 9.6     Recent Labs     08/08/19  0055 08/09/19  0215 08/10/19  0246   ALTSGPT 379* 309* 276*   ASTSGOT 282* 133* 85*   ALKPHOSPHAT 78 93 117*   TBILIRUBIN 3.7* 6.0* 1.8*   GLUCOSE 125* 111* 122*     Recent Labs      08/08/19  0055 08/09/19  0215 08/10/19  0246   RBC 4.64* 4.58* 4.92   HEMOGLOBIN 13.6* 13.1* 13.8*   HEMATOCRIT 40.8* 41.2* 44.2   PLATELETCT 256 214 239   PROTHROMBTM 15.1*  --   --    APTT 24.0*  --   --    INR 1.16*  --   --      Recent Labs     08/08/19  0055 08/09/19  0215 08/10/19  0246   WBC 35.5* 17.6* 14.1*   NEUTSPOLYS 84.60* 88.60* 80.40*   LYMPHOCYTES 0.00* 3.80* 11.90*   MONOCYTES 4.30 6.70 5.90   EOSINOPHILS 0.00 0.20 0.90   BASOPHILS 0.00 0.20 0.40   ASTSGOT 282* 133* 85*   ALTSGPT 379* 309* 276*   ALKPHOSPHAT 78 93 117*   TBILIRUBIN 3.7* 6.0* 1.8*     Hemodynamics:  Temp (24hrs), Av.5 °C (97.7 °F), Min:36.1 °C (96.9 °F), Max:37 °C (98.6 °F)  Temperature: 36.1 °C (96.9 °F)  Pulse  Av.7  Min: 75  Max: 109   Blood Pressure: 119/81     Respiratory:    Respiration: 16, Pulse Oximetry: 94 %           Fluids:    Intake/Output Summary (Last 24 hours) at 2019 1514  Last data filed at 2019 0647  Gross per 24 hour   Intake 1737.5 ml   Output --   Net 1737.5 ml     Weight: 70.5 kg (155 lb 6.8 oz)  GI/Nutrition:  Orders Placed This Encounter   Procedures   • Diet Order Regular (Vegetarian )     Standing Status:   Standing     Number of Occurrences:   1     Order Specific Question:   Diet:     Answer:   Regular [1]     Comments:   Vegetarian      Order Specific Question:   Miscellaneous modifications:     Answer:   Vegetarian [13]     Medical Decision Making, by Problem:  Active Hospital Problems    Diagnosis   • Leucocytosis [D72.829]   • Hypokalemia [E87.6]   • Elevated liver enzymes [R74.8]   • Ascending cholangitis [K83.09]   • Sepsis (HCC) [A41.9]   • Kidney transplanted [Z94.0]   • Immunosuppression (HCC) [D89.9]     IMPRESSION:  1.  Ascending cholangitis.  ERCP:  Ascending cholangitis with pus as well as multiple stones and sludge removed from the dilated common bile duct, Placement of 10Fr x 7cm plastic common bile duct stent  3.  Abnormal liver enzymes, improved and bilirubin down to  1.8.  4.  Leukocytosis with bandemia, much improved  5.  Right upper quadrant pain, improved.  6.  History of choledocholithiasis in 2010 and 2011, status post endoscopic   retrograde cholangiopancreatography x3 requiring mechanical lithotripsy.  7.  Status post cholecystectomy in 1996.  8.  Alport syndrome with hearing loss, decreased eyesight, and renal failure.  9.  Status post living-related kidney transplant in 1993 after 2 failed   cadaveric kidney transplants.  10.  Squamous cell skin cancer.    Plan:  Continue abx x 14 days total  Continue Prograf and Prednisone   Agree with regular diet  Can follow up with his gastroenterologist at [place], Dr. Barney, for stent removal  Signing off and OK for home tomorrow    Quality-Core Measures

## 2019-08-11 ENCOUNTER — PATIENT OUTREACH (OUTPATIENT)
Dept: HEALTH INFORMATION MANAGEMENT | Facility: OTHER | Age: 44
End: 2019-08-11

## 2019-08-11 VITALS
SYSTOLIC BLOOD PRESSURE: 134 MMHG | OXYGEN SATURATION: 93 % | HEIGHT: 63 IN | TEMPERATURE: 97.2 F | RESPIRATION RATE: 17 BRPM | HEART RATE: 83 BPM | BODY MASS INDEX: 27.66 KG/M2 | DIASTOLIC BLOOD PRESSURE: 93 MMHG | WEIGHT: 156.09 LBS

## 2019-08-11 LAB
ALBUMIN SERPL BCP-MCNC: 3.4 G/DL (ref 3.2–4.9)
ALBUMIN/GLOB SERPL: 1.1 G/DL
ALP SERPL-CCNC: 108 U/L (ref 30–99)
ALT SERPL-CCNC: 218 U/L (ref 2–50)
ANION GAP SERPL CALC-SCNC: 9 MMOL/L (ref 0–11.9)
AST SERPL-CCNC: 63 U/L (ref 12–45)
BILIRUB SERPL-MCNC: 1.2 MG/DL (ref 0.1–1.5)
BUN SERPL-MCNC: 9 MG/DL (ref 8–22)
CALCIUM SERPL-MCNC: 9.3 MG/DL (ref 8.5–10.5)
CHLORIDE SERPL-SCNC: 107 MMOL/L (ref 96–112)
CO2 SERPL-SCNC: 23 MMOL/L (ref 20–33)
CREAT SERPL-MCNC: 0.67 MG/DL (ref 0.5–1.4)
ERYTHROCYTE [DISTWIDTH] IN BLOOD BY AUTOMATED COUNT: 47.1 FL (ref 35.9–50)
GLOBULIN SER CALC-MCNC: 3.1 G/DL (ref 1.9–3.5)
GLUCOSE SERPL-MCNC: 144 MG/DL (ref 65–99)
HCT VFR BLD AUTO: 45.8 % (ref 42–52)
HGB BLD-MCNC: 14.4 G/DL (ref 14–18)
MCH RBC QN AUTO: 28.4 PG (ref 27–33)
MCHC RBC AUTO-ENTMCNC: 31.4 G/DL (ref 33.7–35.3)
MCV RBC AUTO: 90.3 FL (ref 81.4–97.8)
PLATELET # BLD AUTO: 236 K/UL (ref 164–446)
PMV BLD AUTO: 9.7 FL (ref 9–12.9)
POTASSIUM SERPL-SCNC: 3.6 MMOL/L (ref 3.6–5.5)
PROT SERPL-MCNC: 6.5 G/DL (ref 6–8.2)
RBC # BLD AUTO: 5.07 M/UL (ref 4.7–6.1)
SODIUM SERPL-SCNC: 139 MMOL/L (ref 135–145)
WBC # BLD AUTO: 12.5 K/UL (ref 4.8–10.8)

## 2019-08-11 PROCEDURE — 700111 HCHG RX REV CODE 636 W/ 250 OVERRIDE (IP): Performed by: INTERNAL MEDICINE

## 2019-08-11 PROCEDURE — 99239 HOSP IP/OBS DSCHRG MGMT >30: CPT | Performed by: INTERNAL MEDICINE

## 2019-08-11 PROCEDURE — A9270 NON-COVERED ITEM OR SERVICE: HCPCS | Performed by: INTERNAL MEDICINE

## 2019-08-11 PROCEDURE — 700102 HCHG RX REV CODE 250 W/ 637 OVERRIDE(OP): Performed by: INTERNAL MEDICINE

## 2019-08-11 PROCEDURE — 80053 COMPREHEN METABOLIC PANEL: CPT

## 2019-08-11 PROCEDURE — 85027 COMPLETE CBC AUTOMATED: CPT

## 2019-08-11 PROCEDURE — 700105 HCHG RX REV CODE 258: Performed by: INTERNAL MEDICINE

## 2019-08-11 PROCEDURE — 36415 COLL VENOUS BLD VENIPUNCTURE: CPT

## 2019-08-11 RX ORDER — OXYCODONE HYDROCHLORIDE 5 MG/1
5 TABLET ORAL
Qty: 12 TAB | Refills: 0 | Status: SHIPPED | OUTPATIENT
Start: 2019-08-11 | End: 2019-08-14

## 2019-08-11 RX ORDER — ONDANSETRON 4 MG/1
4 TABLET, ORALLY DISINTEGRATING ORAL EVERY 4 HOURS PRN
Qty: 20 TAB | Refills: 0 | Status: SHIPPED | OUTPATIENT
Start: 2019-08-11

## 2019-08-11 RX ORDER — CEFDINIR 300 MG/1
300 CAPSULE ORAL 2 TIMES DAILY
Qty: 28 CAP | Refills: 0 | Status: SHIPPED | OUTPATIENT
Start: 2019-08-11 | End: 2019-08-25

## 2019-08-11 RX ORDER — METRONIDAZOLE 500 MG/1
500 TABLET ORAL EVERY 8 HOURS
Qty: 42 TAB | Refills: 0 | Status: SHIPPED | OUTPATIENT
Start: 2019-08-11 | End: 2019-08-25

## 2019-08-11 RX ADMIN — CEFTRIAXONE SODIUM 2 G: 2 INJECTION, POWDER, FOR SOLUTION INTRAMUSCULAR; INTRAVENOUS at 05:23

## 2019-08-11 RX ADMIN — PREDNISONE 7.5 MG: 5 TABLET ORAL at 05:23

## 2019-08-11 RX ADMIN — TACROLIMUS 1 MG: 1 CAPSULE ORAL at 05:23

## 2019-08-11 RX ADMIN — METRONIDAZOLE 500 MG: 500 TABLET, FILM COATED ORAL at 14:31

## 2019-08-11 RX ADMIN — ACETAMINOPHEN 650 MG: 325 TABLET, FILM COATED ORAL at 00:59

## 2019-08-11 RX ADMIN — METRONIDAZOLE 500 MG: 500 TABLET, FILM COATED ORAL at 05:23

## 2019-08-11 NOTE — PROGRESS NOTES
Received bedside report from RN, pt care assumed, VSS, Pt AAOx4, On RA, c/o 0/10 pain at this time. No signs of acute distress noted at this time. POC discussed with pt and verbalizes no questions. Pt denies any additional needs at this time. Bed in lowest position, bed alarm off, pt ambulatory, pt educated on fall risk and verbalized understanding, call light within reach, hourly rounding initiated.

## 2019-08-11 NOTE — DISCHARGE INSTRUCTIONS
Discharge Instructions    Discharged to home by taxi with self. Discharged via wheelchair, hospital escort: Yes.  Special equipment needed: Not Applicable    Be sure to schedule a follow-up appointment with your primary care doctor or any specialists as instructed.     Discharge Plan:   Diet Plan: Discussed  Activity Level: Discussed  Confirmed Follow up Appointment: Patient to Call and Schedule Appointment  Confirmed Symptoms Management: Discussed  Medication Reconciliation Updated: Yes  Influenza Vaccine Indication: Not indicated: Previously immunized this influenza season and > 8 years of age    I understand that a diet low in cholesterol, fat, and sodium is recommended for good health. Unless I have been given specific instructions below for another diet, I accept this instruction as my diet prescription.   Other diet: n/a    Special Instructions: None    · Is patient discharged on Warfarin / Coumadin?   No       Cholangitis  Introduction  Cholangitis is inflammation of the group of tubes (ducts) that carry digestive juices from the liver, gallbladder, and pancreas to the small intestine. This group of ducts is called the biliary tract. Cholangitis can cause fever, abdominal pain, and yellowish discoloration of the skin, the whites of the eyes, and mucous membranes (jaundice).  Cholangitis can get worse very quickly and cause infection throughout the body (sepsis). It is important to diagnose and treat cholangitis as soon as possible.  What are the causes?  This condition is usually caused by a blockage (obstruction) in the biliary tract. The most common causes of obstruction are:  · Formation of hard particles (stones) in the biliary tract.  · Damage to the biliary tract from a previous surgical or diagnostic procedure.  Other causes of an obstruction include:  · Cysts or tumors in the biliary tract.  · A type of liver disease that affects the biliary tract (primary sclerosing cholangitis).  · Being born with a  narrow biliary tract.  When the flow of digestive juices is blocked, bacteria that normally live in the intestine can grow and spread inside the biliary tract.  What increases the risk?  The following factors may make you more likely to develop this condition:  · Being 50?60 years old.  · Having a history of stones in the biliary tract.  · Having had cholangitis in the past.  · Having HIV.  · Having another condition that affects the biliary tract.  · Having had a procedure to diagnose or treat problems with the biliary tract, especially endoscopic retrograde cholangiopancreatography (ERCP). These types of procedures may cause scarring and obstruction that can lead to infection.  What are the signs or symptoms?  The most common symptoms of this condition are fever, abdominal pain, and jaundice. Most of the time, all of these symptoms are present. Other symptoms may include:  · Chills.  · Tiredness.  · Nausea.  · Dark-colored urine.  · [place]-colored stools.  · Confusion.  · Itchy skin.  How is this diagnosed?  This condition may be diagnosed based on:  · Your symptoms.  · A physical exam.  · Your medical history. Your health care provider may ask whether you have had stones, ERCP, or other procedures involving the biliary tract in the past.  · Blood tests.  · Imaging studies, such as:  ¨ An ultrasound. This uses sound waves to make an image of any obstructions that you have.  ¨ An MRI.  ¨ A CT scan.  · ERCP to check the biliary tract for possible causes of cholangitis. During ERCP, a thin, lighted tube (endoscope) is passed through your mouth and down your throat into the first part of your small intestine (duodenum). A small, plastic tube (cannula) is then passed through the endoscope and directed into your bile duct or pancreatic duct. Dye is then injected through the cannula and X-rays are taken.  How is this treated?     This condition is usually treated at a hospital. Treatment may include:  · Receiving fluids,  nutrition, and antibiotic medicines through an IV tube. You may be given antibiotics that kill most of the bacteria known to cause cholangitis (broad spectrum antibiotics).  · ERCP or another surgical procedure to open and drain the biliary tract.  Follow these instructions at home:  Medicines  · Take over-the-counter and prescription medicines only as told by your health care provider.  · Take your antibiotic medicine as told by your health care provider. Do not stop taking the antibiotic even if you start to feel better.  General instructions  · Return to your normal activities as told by your health care provider. Ask your health care provider what activities are safe for you.  · Follow instructions from your health care provider about eating or drinking restrictions.  · Maintain a healthy weight.  · Exercise regularly, as told by your health care provider.  · Keep all follow-up visits as told by your health care provider. This is important.  Contact a health care provider if:  · You have a fever.  · Your symptoms return or become more severe.  · You suddenly lose weight.  This information is not intended to replace advice given to you by your health care provider. Make sure you discuss any questions you have with your health care provider.  Document Released: 01/13/2017 Document Revised: 05/25/2017 Document Reviewed: 01/06/2016  © 2017 [employer]    Depression / Suicide Risk    As you are discharged from this [facility] facility, it is important to learn how to keep safe from harming yourself.    Recognize the warning signs:  · Abrupt changes in personality, positive or negative- including increase in energy   · Giving away possessions  · Change in eating patterns- significant weight changes-  positive or negative  · Change in sleeping patterns- unable to sleep or sleeping all the time   · Unwillingness or inability to communicate  · Depression  · Unusual sadness, discouragement and loneliness  · Talk of  wanting to die  · Neglect of personal appearance   · Rebelliousness- reckless behavior  · Withdrawal from people/activities they love  · Confusion- inability to concentrate     If you or a loved one observes any of these behaviors or has concerns about self-harm, here's what you can do:  · Talk about it- your feelings and reasons for harming yourself  · Remove any means that you might use to hurt yourself (examples: pills, rope, extension cords, firearm)  · Get professional help from the community (Mental Health, Substance Abuse, psychological counseling)  · Do not be alone:Call your Safe Contact- someone whom you trust who will be there for you.  · Call your local CRISIS HOTLINE 761-7957 or 622-088-3420  · Call your local Children's Mobile Crisis Response Team [place] (219) 802-2552 or www.kompany  · Call the toll free National Suicide Prevention Hotlines   · National Suicide Prevention Lifeline 947-099-DLJN (9160)  · National Hope Line Network 800-SUICIDE (120-8771)

## 2019-08-11 NOTE — CARE PLAN
Problem: Safety  Goal: Will remain free from falls  Outcome: PROGRESSING AS EXPECTED  Intervention: Implement fall precautions  Flowsheets (Taken 8/11/2019 0700)  Environmental Precautions: Treaded Slipper Socks on Patient;Personal Belongings, Wastebasket, Call Bell etc. in Easy Reach;Transferred to Stronger Side;Report Given to Other Health Care Providers Regarding Fall Risk;Bed in Low Position;Communication Sign for Patients & Families;Mobility Assessed & Appropriate Sign Placed     Problem: Infection  Goal: Will remain free from infection  Outcome: PROGRESSING AS EXPECTED

## 2019-08-11 NOTE — PROGRESS NOTES
Bedside report received, assumed pt care. Pt sleeping in bed, no signs of distress or pain noted. POC updated. Tele monitor on, safety precautions in place, call light in reach.

## 2019-08-11 NOTE — DISCHARGE SUMMARY
Discharge Summary    CHIEF COMPLAINT ON ADMISSION  Chief Complaint   Patient presents with   • Sent by MD     transfer from [facility] for sepsis and obstructed bile duct       Reason for Admission  EMS     Admission Date  8/7/2019    CODE STATUS  Full Code    HPI & HOSPITAL COURSE     45 y/o M with PMH of cholangitis, history of kidney transplant, came in with abdominal pain and found to have cholangitis.  Please refer to H&P for detail.  The patient was found to have sepsis and started on IV antibiotic and aggressive IV fluid.  GI was consulted and did ERCP with stone and passed removal and temporary stent placement.  His symptom continue to improve and his sepsis has already resolved.  Culture were negative.  His liver enzymes and bilirubin were improving significantly compared to the day of admission.  According to GI he can be discharged home today and follow-up with GI at [facility] in [facility] as soon as possible.  He was also discharged on oral antibiotic for additional 14 days in total.  He was instructed to go to nearest ER if his symptoms get worse.  I saw and examined the patient upon discharge.  Please call [phone] to schedule PCP appointment for patient.    Required specialty appointments include:   GI at [facility] in 1 week        Therefore, he is discharged in fair and stable condition to home with close outpatient follow-up.    The patient met 2-midnight criteria for an inpatient stay at the time of discharge.    Discharge Date  8/11/19    FOLLOW UP ITEMS POST DISCHARGE      DISCHARGE DIAGNOSES  Active Problems:    Ascending cholangitis POA: Yes    Sepsis (HCC) POA: Yes    Kidney transplanted POA: Yes    Immunosuppression (HCC) POA: Yes    Leucocytosis POA: Yes    Hypokalemia POA: Unknown    Elevated liver enzymes POA: Unknown  Resolved Problems:    * No resolved hospital problems. *      FOLLOW UP  No future appointments.  PCP with [facility] in 1 week          GI with [facility] in 1  week            MEDICATIONS ON DISCHARGE     Medication List      START taking these medications      Instructions   cefdinir 300 MG Caps  Commonly known as:  OMNICEF   Take 1 Cap by mouth 2 times a day for 14 days.  Dose:  300 mg     metroNIDAZOLE 500 MG Tabs  Commonly known as:  FLAGYL   Take 1 Tab by mouth every 8 hours for 14 days.  Dose:  500 mg     ondansetron 4 MG Tbdp  Commonly known as:  ZOFRAN ODT   Take 1 Tab by mouth every four hours as needed for Nausea (give PO if no IV route available).  Dose:  4 mg     oxyCODONE immediate-release 5 MG Tabs  Commonly known as:  ROXICODONE   Take 1 Tab by mouth every 3 hours as needed for up to 3 days.  Dose:  5 mg        CONTINUE taking these medications      Instructions   predniSONE 5 MG Tabs  Commonly known as:  DELTASONE   Take 7.5 mg by mouth every day.  Dose:  7.5 mg     tacrolimus 1 MG Caps  Commonly known as:  PROGRAF   Take 1 mg by mouth 2 times a day.  Dose:  1 mg            Allergies  Allergies   Allergen Reactions   • Penicillins      Hives   • Vancomycin      Increased BP       DIET  Orders Placed This Encounter   Procedures   • Diet Order Regular (Vegetarian )     Standing Status:   Standing     Number of Occurrences:   1     Order Specific Question:   Diet:     Answer:   Regular [1]     Comments:   Vegetarian      Order Specific Question:   Miscellaneous modifications:     Answer:   Vegetarian [13]       ACTIVITY  As tolerated.  Weight bearing as tolerated    CONSULTATIONS  GI    PROCEDURES  ERCP    LABORATORY  Lab Results   Component Value Date    SODIUM 143 08/10/2019    POTASSIUM 3.4 (L) 08/10/2019    CHLORIDE 109 08/10/2019    CO2 25 08/10/2019    GLUCOSE 122 (H) 08/10/2019    BUN 9 08/10/2019    CREATININE 0.66 08/10/2019        Lab Results   Component Value Date    WBC 14.1 (H) 08/10/2019    HEMOGLOBIN 13.8 (L) 08/10/2019    HEMATOCRIT 44.2 08/10/2019    PLATELETCT 239 08/10/2019        Total time of the discharge process exceeds 40 minutes.

## 2019-08-11 NOTE — PROGRESS NOTES
Pt resting comfortably in bed watching TV. CNA notified this RN that pt had temp of 100.5. Tylenol given. No other complaints noted at this time. Will monitor.

## 2019-08-11 NOTE — PROGRESS NOTES
Pt discharged to home/self care. Pt provided with discharge instructions and education. Pt AOx4 and verbalized clear understanding of education provided. Pt signed AVS and opiate consent form. Tele monitor and PIV removed prior to discharge. Pt verbalized no additional needs or questions at this time.

## 2019-08-11 NOTE — CARE PLAN
Problem: Bowel/Gastric:  Goal: Normal bowel function is maintained or improved  Outcome: PROGRESSING AS EXPECTED  Intervention: Educate patient and significant other/support system about diet, fluid intake, medications and activity to promote bowel function  Note:   Pt had bowel movement 8/9. Medications in place to promote bowel function. Pt educated regarding importance of bowel function and verbalized understanding.

## 2019-08-13 LAB
BACTERIA BLD CULT: NORMAL
SIGNIFICANT IND 70042: NORMAL
SITE SITE: NORMAL
SOURCE SOURCE: NORMAL

## 2022-03-23 NOTE — CARE PLAN
Problem: Communication  Goal: The ability to communicate needs accurately and effectively will improve  Outcome: PROGRESSING AS EXPECTED  Intervention: Educate patient and significant other/support system about the plan of care, procedures, treatments, medications and allow for questions  Flowsheets (Taken 8/9/2019 1921)  Pt & Family Have Been Educated on Methods Available to Report Concerns Related to Care, Treatment, Services, and Patient Safety Issues: Yes  Note:   Pt verbalized understanding to communicate needs with staff. Call light within reach. Hourly rounding in place. POC discussed and all questions answered.      Problem: Infection  Goal: Will remain free from infection  Outcome: PROGRESSING AS EXPECTED  Intervention: Implement standard precautions and perform hand washing before and after patient contact  Note:   Staff, friends, and family will use hand hygiene before and after pt contact and upon entering and exiting pt room.       Patient attended Phase 2 Cardiac Rehab Exercise Session. Further documentation will be scanned into the medical record upon discharge.

## (undated) DEVICE — KIT CUSTOM PROCEDURE SINGLE FOR ENDO  (15/CA)

## (undated) DEVICE — SPONGE GAUZE NON-STERILE 4X4 - (2000/CA 10PK/CA)

## (undated) DEVICE — BALLOON RETRIEVAL EXTRACTOR PRO RX  12-15MM

## (undated) DEVICE — HEAD HOLDER JUNIOR/ADULT

## (undated) DEVICE — CANISTER SUCTION 3000ML MECHANICAL FILTER AUTO SHUTOFF MEDI-VAC NONSTERILE LF DISP  (40EA/CA)

## (undated) DEVICE — CONTAINER, SPECIMEN, STERILE

## (undated) DEVICE — NEPTUNE 4 PORT MANIFOLD - (20/PK)

## (undated) DEVICE — SUCTION INSTRUMENT YANKAUER BULBOUS TIP W/O VENT (50EA/CA)

## (undated) DEVICE — INTRODUCER STENT NAVIFLEX 10FR

## (undated) DEVICE — FILM CASSETTE ENDO

## (undated) DEVICE — PROTECTOR ULNA NERVE - (36PR/CA)

## (undated) DEVICE — KIT ANESTHESIA W/CIRCUIT & 3/LT BAG W/FILTER (20EA/CA)

## (undated) DEVICE — MASK ANESTHESIA ADULT  - (100/CA)

## (undated) DEVICE — JAGTOME RX 39 PRELOADED .025 X 260CM

## (undated) DEVICE — BITE BLOCK ADULT 60FR (100EA/CA)

## (undated) DEVICE — BALLOON RETRIEVAL EXTRACTOR PRO RX   9-12MM